# Patient Record
Sex: FEMALE | Race: WHITE | NOT HISPANIC OR LATINO | Employment: OTHER | URBAN - METROPOLITAN AREA
[De-identification: names, ages, dates, MRNs, and addresses within clinical notes are randomized per-mention and may not be internally consistent; named-entity substitution may affect disease eponyms.]

---

## 2017-01-19 ENCOUNTER — OFFICE VISIT - HEALTHEAST (OUTPATIENT)
Dept: INTERNAL MEDICINE | Facility: CLINIC | Age: 82
End: 2017-01-19

## 2017-01-19 ENCOUNTER — RECORDS - HEALTHEAST (OUTPATIENT)
Dept: GENERAL RADIOLOGY | Facility: CLINIC | Age: 82
End: 2017-01-19

## 2017-01-19 DIAGNOSIS — R07.9 CHEST PAIN: ICD-10-CM

## 2017-01-19 DIAGNOSIS — I10 HTN (HYPERTENSION): ICD-10-CM

## 2017-01-19 DIAGNOSIS — H92.03 EAR PAIN, BILATERAL: ICD-10-CM

## 2017-01-19 DIAGNOSIS — L21.9 SEBORRHEIC DERMATITIS: ICD-10-CM

## 2017-01-19 DIAGNOSIS — F41.9 ANXIETY: ICD-10-CM

## 2017-01-19 DIAGNOSIS — M54.9 DORSALGIA, UNSPECIFIED: ICD-10-CM

## 2017-01-19 DIAGNOSIS — M54.9 BACK PAIN: ICD-10-CM

## 2017-01-19 ASSESSMENT — MIFFLIN-ST. JEOR: SCORE: 911.64

## 2017-01-20 ENCOUNTER — COMMUNICATION - HEALTHEAST (OUTPATIENT)
Dept: INTERNAL MEDICINE | Facility: CLINIC | Age: 82
End: 2017-01-20

## 2017-02-16 ENCOUNTER — OFFICE VISIT - HEALTHEAST (OUTPATIENT)
Dept: INTERNAL MEDICINE | Facility: CLINIC | Age: 82
End: 2017-02-16

## 2017-02-16 DIAGNOSIS — F41.9 ANXIETY: ICD-10-CM

## 2017-02-16 DIAGNOSIS — L30.9 DERMATITIS: ICD-10-CM

## 2017-02-16 DIAGNOSIS — E11.9 DIABETES (H): ICD-10-CM

## 2017-02-16 DIAGNOSIS — I10 HTN (HYPERTENSION): ICD-10-CM

## 2017-02-16 DIAGNOSIS — D05.12 DUCTAL CARCINOMA IN SITU (DCIS) OF LEFT BREAST: ICD-10-CM

## 2017-02-16 DIAGNOSIS — L21.9 SEBORRHEIC DERMATITIS: ICD-10-CM

## 2017-02-16 LAB — HBA1C MFR BLD: 6.8 % (ref 3.5–6)

## 2017-02-16 ASSESSMENT — MIFFLIN-ST. JEOR: SCORE: 893.78

## 2017-02-18 ENCOUNTER — COMMUNICATION - HEALTHEAST (OUTPATIENT)
Dept: INTERNAL MEDICINE | Facility: CLINIC | Age: 82
End: 2017-02-18

## 2017-02-18 DIAGNOSIS — K21.9 GERD (GASTROESOPHAGEAL REFLUX DISEASE): ICD-10-CM

## 2017-02-20 ENCOUNTER — COMMUNICATION - HEALTHEAST (OUTPATIENT)
Dept: INTERNAL MEDICINE | Facility: CLINIC | Age: 82
End: 2017-02-20

## 2017-03-08 ENCOUNTER — COMMUNICATION - HEALTHEAST (OUTPATIENT)
Dept: INTERNAL MEDICINE | Facility: CLINIC | Age: 82
End: 2017-03-08

## 2017-03-08 DIAGNOSIS — J45.909 ASTHMA: ICD-10-CM

## 2017-03-08 DIAGNOSIS — I50.9 CHF (CONGESTIVE HEART FAILURE) (H): ICD-10-CM

## 2017-03-11 ENCOUNTER — COMMUNICATION - HEALTHEAST (OUTPATIENT)
Dept: INTERNAL MEDICINE | Facility: CLINIC | Age: 82
End: 2017-03-11

## 2017-03-11 DIAGNOSIS — I10 HTN (HYPERTENSION): ICD-10-CM

## 2017-03-13 ENCOUNTER — COMMUNICATION - HEALTHEAST (OUTPATIENT)
Dept: TELEHEALTH | Facility: CLINIC | Age: 82
End: 2017-03-13

## 2017-03-13 ENCOUNTER — OFFICE VISIT - HEALTHEAST (OUTPATIENT)
Dept: INTERNAL MEDICINE | Facility: CLINIC | Age: 82
End: 2017-03-13

## 2017-03-13 DIAGNOSIS — F41.9 ANXIETY: ICD-10-CM

## 2017-03-13 DIAGNOSIS — H61.20 IMPACTED EAR WAX: ICD-10-CM

## 2017-03-13 DIAGNOSIS — R21 RASH: ICD-10-CM

## 2017-03-13 DIAGNOSIS — I10 HTN (HYPERTENSION): ICD-10-CM

## 2017-03-13 DIAGNOSIS — E11.9 DIABETES TYPE 2, CONTROLLED (H): ICD-10-CM

## 2017-03-22 ENCOUNTER — COMMUNICATION - HEALTHEAST (OUTPATIENT)
Dept: INTERNAL MEDICINE | Facility: CLINIC | Age: 82
End: 2017-03-22

## 2017-03-22 DIAGNOSIS — R52 PAIN: ICD-10-CM

## 2017-03-23 ENCOUNTER — RECORDS - HEALTHEAST (OUTPATIENT)
Dept: ADMINISTRATIVE | Facility: OTHER | Age: 82
End: 2017-03-23

## 2017-03-23 ENCOUNTER — OFFICE VISIT - HEALTHEAST (OUTPATIENT)
Dept: INTERNAL MEDICINE | Facility: CLINIC | Age: 82
End: 2017-03-23

## 2017-03-23 DIAGNOSIS — E11.9 TYPE 2 DIABETES MELLITUS WITHOUT COMPLICATION (H): ICD-10-CM

## 2017-03-23 DIAGNOSIS — F32.89 DEPRESSIVE DISORDER, NOT ELSEWHERE CLASSIFIED: ICD-10-CM

## 2017-03-23 DIAGNOSIS — M54.9 BACK PAIN: ICD-10-CM

## 2017-03-23 DIAGNOSIS — I10 ESSENTIAL HYPERTENSION: ICD-10-CM

## 2017-03-23 ASSESSMENT — MIFFLIN-ST. JEOR: SCORE: 891.37

## 2017-04-12 ENCOUNTER — COMMUNICATION - HEALTHEAST (OUTPATIENT)
Dept: INTERNAL MEDICINE | Facility: CLINIC | Age: 82
End: 2017-04-12

## 2017-04-12 DIAGNOSIS — E78.1 HYPERTRIGLYCERIDEMIA: ICD-10-CM

## 2017-04-12 DIAGNOSIS — L30.9 DERMATITIS: ICD-10-CM

## 2017-04-12 DIAGNOSIS — R21 RASH: ICD-10-CM

## 2017-04-12 DIAGNOSIS — I10 HTN (HYPERTENSION): ICD-10-CM

## 2017-04-12 DIAGNOSIS — E11.9 TYPE 2 DIABETES MELLITUS WITHOUT COMPLICATION (H): ICD-10-CM

## 2017-04-12 DIAGNOSIS — I50.9 CHF (CONGESTIVE HEART FAILURE) (H): ICD-10-CM

## 2017-04-12 DIAGNOSIS — F41.9 ANXIETY: ICD-10-CM

## 2017-04-12 DIAGNOSIS — L21.9 SEBORRHEIC DERMATITIS: ICD-10-CM

## 2017-04-12 DIAGNOSIS — F32.A DEPRESSION: ICD-10-CM

## 2017-04-12 DIAGNOSIS — R60.9 EDEMA: ICD-10-CM

## 2017-04-12 DIAGNOSIS — E78.5 HYPERLIPIDEMIA: ICD-10-CM

## 2017-04-12 DIAGNOSIS — M81.0 OSTEOPOROSIS: ICD-10-CM

## 2017-04-12 DIAGNOSIS — M54.9 BACK PAIN: ICD-10-CM

## 2017-04-12 DIAGNOSIS — J45.909 ASTHMA: ICD-10-CM

## 2017-05-01 ENCOUNTER — COMMUNICATION - HEALTHEAST (OUTPATIENT)
Dept: INTERNAL MEDICINE | Facility: CLINIC | Age: 82
End: 2017-05-01

## 2017-05-01 DIAGNOSIS — F41.9 ANXIETY: ICD-10-CM

## 2017-05-03 ENCOUNTER — COMMUNICATION - HEALTHEAST (OUTPATIENT)
Dept: INTERNAL MEDICINE | Facility: CLINIC | Age: 82
End: 2017-05-03

## 2017-05-17 ENCOUNTER — COMMUNICATION - HEALTHEAST (OUTPATIENT)
Dept: INTERNAL MEDICINE | Facility: CLINIC | Age: 82
End: 2017-05-17

## 2017-05-30 ENCOUNTER — COMMUNICATION - HEALTHEAST (OUTPATIENT)
Dept: INTERNAL MEDICINE | Facility: CLINIC | Age: 82
End: 2017-05-30

## 2017-05-31 ENCOUNTER — COMMUNICATION - HEALTHEAST (OUTPATIENT)
Dept: SCHEDULING | Facility: CLINIC | Age: 82
End: 2017-05-31

## 2018-05-29 ENCOUNTER — COMMUNICATION - HEALTHEAST (OUTPATIENT)
Dept: NURSING | Facility: CLINIC | Age: 83
End: 2018-05-29

## 2018-05-29 ENCOUNTER — COMMUNICATION - HEALTHEAST (OUTPATIENT)
Dept: INTERNAL MEDICINE | Facility: CLINIC | Age: 83
End: 2018-05-29

## 2018-05-29 DIAGNOSIS — I10 HTN (HYPERTENSION): ICD-10-CM

## 2018-05-29 DIAGNOSIS — J45.909 ASTHMA: ICD-10-CM

## 2018-05-29 DIAGNOSIS — F32.A DEPRESSION: ICD-10-CM

## 2018-06-29 ENCOUNTER — COMMUNICATION - HEALTHEAST (OUTPATIENT)
Dept: INTERNAL MEDICINE | Facility: CLINIC | Age: 83
End: 2018-06-29

## 2018-06-29 DIAGNOSIS — I10 HTN (HYPERTENSION): ICD-10-CM

## 2018-12-13 ENCOUNTER — COMMUNICATION - HEALTHEAST (OUTPATIENT)
Dept: INTERNAL MEDICINE | Facility: CLINIC | Age: 83
End: 2018-12-13

## 2018-12-13 DIAGNOSIS — R21 RASH: ICD-10-CM

## 2019-04-24 ENCOUNTER — COMMUNICATION - HEALTHEAST (OUTPATIENT)
Dept: SCHEDULING | Facility: CLINIC | Age: 84
End: 2019-04-24

## 2019-05-29 ENCOUNTER — COMMUNICATION - HEALTHEAST (OUTPATIENT)
Dept: INTERNAL MEDICINE | Facility: CLINIC | Age: 84
End: 2019-05-29

## 2019-05-29 DIAGNOSIS — I10 HTN (HYPERTENSION): ICD-10-CM

## 2021-05-26 ENCOUNTER — RECORDS - HEALTHEAST (OUTPATIENT)
Dept: ADMINISTRATIVE | Facility: CLINIC | Age: 86
End: 2021-05-26

## 2021-05-27 ENCOUNTER — RECORDS - HEALTHEAST (OUTPATIENT)
Dept: ADMINISTRATIVE | Facility: CLINIC | Age: 86
End: 2021-05-27

## 2021-05-28 ENCOUNTER — RECORDS - HEALTHEAST (OUTPATIENT)
Dept: ADMINISTRATIVE | Facility: CLINIC | Age: 86
End: 2021-05-28

## 2021-05-28 NOTE — TELEPHONE ENCOUNTER
Pt would like to know when she was started on the lexapro.    Information given    Sharon Madison, RN  [facility] Medication Refill and Triage Nurse  4/24/2019  10:01 AM      Reason for Disposition    Caller has medication question only, adult not sick, and triager answers question    Protocols used: MEDICATION QUESTION CALL-A-

## 2021-05-29 ENCOUNTER — RECORDS - HEALTHEAST (OUTPATIENT)
Dept: ADMINISTRATIVE | Facility: CLINIC | Age: 86
End: 2021-05-29

## 2021-05-29 NOTE — TELEPHONE ENCOUNTER
RN cannot approve Refill Request    RN can NOT refill this medication overdue for office visits and/or labs.    Tyler Miranda, [facility] Triage/Med Refill 5/30/2019    Requested Prescriptions   Pending Prescriptions Disp Refills     losartan (COZAAR) 50 MG tablet [Pharmacy Med Name: LOSARTAN POTASSIUM 50MG TABS] 180 tablet 3     Sig: TAKE ONE TABLET BY MOUTH TWICE A DAY       Angiotensin Receptor Blocker Protocol Failed - 5/29/2019  4:46 PM        Failed - PCP or prescribing provider visit in past 12 months       Last office visit with prescriber/PCP: 3/23/2017 Indiana Hale MD OR same dept: Visit date not found OR same specialty: 3/23/2017 Indiana Hale MD  Last physical: Visit date not found Last MTM visit: Visit date not found   Next visit within 3 mo: Visit date not found  Next physical within 3 mo: Visit date not found  Prescriber OR PCP: Indiana Hale MD  Last diagnosis associated with med order: 1. HTN (hypertension)  - losartan (COZAAR) 50 MG tablet [Pharmacy Med Name: LOSARTAN POTASSIUM 50MG TABS]; TAKE ONE TABLET BY MOUTH TWICE A DAY  Dispense: 180 tablet; Refill: 3    If protocol passes may refill for 12 months if within 3 months of last provider visit (or a total of 15 months).             Failed - Serum potassium within the past 12 months     No results found for: LN-POTASSIUM          Failed - Blood pressure filed in past 12 months     BP Readings from Last 1 Encounters:   03/23/17 148/60             Failed - Serum creatinine within the past 12 months     Creatinine   Date Value Ref Range Status   12/13/2016 1.20 (H) 0.60 - 1.10 mg/dL Final

## 2021-05-29 NOTE — TELEPHONE ENCOUNTER
I have not seen pt since 2017, no labs since 2016. From chart review it looks like [facility]    Please ask pharmacy to request refills from new PCP:  Renetta Wynn

## 2021-05-30 ENCOUNTER — RECORDS - HEALTHEAST (OUTPATIENT)
Dept: ADMINISTRATIVE | Facility: CLINIC | Age: 86
End: 2021-05-30

## 2021-05-30 VITALS — WEIGHT: 118.5 LBS | BODY MASS INDEX: 21 KG/M2 | HEIGHT: 63 IN

## 2021-05-30 VITALS — BODY MASS INDEX: 20.3 KG/M2 | HEIGHT: 63 IN | WEIGHT: 114.56 LBS

## 2021-05-30 VITALS — BODY MASS INDEX: 20.2 KG/M2 | HEIGHT: 63 IN | WEIGHT: 114.03 LBS

## 2021-05-30 VITALS — BODY MASS INDEX: 20.35 KG/M2 | WEIGHT: 114.9 LBS

## 2021-05-31 ENCOUNTER — RECORDS - HEALTHEAST (OUTPATIENT)
Dept: ADMINISTRATIVE | Facility: CLINIC | Age: 86
End: 2021-05-31

## 2021-06-01 ENCOUNTER — RECORDS - HEALTHEAST (OUTPATIENT)
Dept: ADMINISTRATIVE | Facility: CLINIC | Age: 86
End: 2021-06-01

## 2021-06-02 ENCOUNTER — RECORDS - HEALTHEAST (OUTPATIENT)
Dept: ADMINISTRATIVE | Facility: CLINIC | Age: 86
End: 2021-06-02

## 2021-06-08 NOTE — PROGRESS NOTES
[facility] Follow Up Note    Assessment/Plan:    1. Chest pain  Patient is a poor historian.  Her  does endorses noticing that she is having some chest discomfort with exertion.  It's very short-lived.  EKG on last visit showed left bundle branch block.  Patient does have a distant history of coronary artery disease.  Discussed that she will need stress testing.  She does have a cardiologist that she follows with an I recommended that she sees him in the office on.    2. HTN (hypertension)  Still above goal.  Given the fact that she might have a urinary artery disease and would like it to be less than 140.  Heart rate is currently in the 60s so I am not going to increase her metoprolol and she will continue on 50 mg a day she is already on losartan maximum dose at 50 mg twice a day, I did not decide to give her hydrochlorothiazide because she has dry mouth and I do not want exacerbate that.  For now we'll increase her Norvasc from 2.5-5 mg a day and she will follow-up with me in a month.  She will continue monitoring blood pressure at home.  I think anxiety contributes to her high blood pressure.  - amLODIPine (NORVASC) 5 MG tablet; Take 1 tablet (5 mg total) by mouth daily.  Dispense: 30 tablet; Refill: 11    3. Anxiety  She skeletally Lexapro 20 mg a day.  She takes Klonopin at night for sleep which helps a lot.  During the day she still has a lot of anxiety and will try BuSpar twice a day of his breakfast and lunch.  She declined psychological evaluation.  - busPIRone (BUSPAR) 5 MG tablet; Take twice a day with breakfast and lunch  Dispense: 60 tablet; Refill: 3    4. Seborrheic dermatitis  She can use to him Synalar cream on her ears but I often not to use it on her face and she'll tried Nasarel cream on ears and face.  - ketoconazole (NIZORAL) 2 % cream; Apply twice a day to face and ears  Dispense: 60 g; Refill: 1    5. Back pain  Currently has resolved but due to her history of breast  cancer did recommend we do a thoracic x-ray.  - XR Thoracic Spine 3 VWS; Future    6.  History of breast cancer, status post right mastectomy and left lumpectomy.  In the future she will need to see oncologist to review her case to determine if she needs to be on chemotherapy prophylactic medication.  Currently she is too overwhelmed and will deferred for later.      Indiana Hale MD    Chief Complaint:  Chief Complaint   Patient presents with     Follow-up     2 mnths       History of Present Illness:  Selam is a 89 y.o. female with history of breast cancer (status post right mastectomy and left lumpectomy), high blood pressure, question process, recurrent falls, mild renal insufficiency, mild asthma, vitamin D deficiency, anxiety and mild diabetes was currently here for follow-up of her blood pressure.    Her last visit here didn't add amlodipine 2.5 mg to her regimen.  She takes metoprolol XL 50 mg a day and losartan 50 mg twice a day.  Patient does not feel that amlodipine has helped.  She has been checking her blood pressures at home and it ranges between 150-170.  Heart rate usually is in the 60s.  Patient does have problems with dry mouth so diuretic would not be a good choice for her.  I think anxiety partly contributes to her high blood pressure as well and we'll address it to.  For now we discussed increasing amlodipine to 5 mg.    Patient also has a distant history of off hard attack and heart disease.  She tells me that she sees Dr. Terrazas [facility] once a year.  Currently she does have mild exertional shortness of breath and palpitations and chest pain.  Patient is not a very good historian but her  did confirm that she does have exertional chest pains which is shortly.  Patient currently is on aspirin.  She had EKG done on her last visit which showed left bundle branch block but no other changes.  We discussed that patient needs to have a stress test done.  I recommended that she  "follows up with her cardiologist.    Patient is a very anxious.  Overall plan is to move to assisted living in the spring but currently she and her  are trying to clean up house and get it radiated force saddling.   does confirm that patient is \"sweating the small stuff.  She also has been doing more around the house I'm not sure if it's secondary to patient doing less ADL due to her anxiety.  She is currently on Lexapro 20 mg, she does take Klonopin 0.5 mg at bedtime and sleeps well.  She refuses seeing a psychologist.  We discussed adding BuSpar twice a day to her regimen.  I think once she moves to assisted living, anxiety will be greatly improved as well.    Patient also had transient upper thoracic back pain which currently has resolved.  She fell a month ago which was a mechanical fall and has not had any recent falls.  She does have history of breast cancer.  We will do an x-ray of her back.    She also has mild type 2 diabetes.  She does check her sugars and in the mornings are usually 120s to 130s and at bedtime 170s on average.  We'll check her A1c on follow-up.  I did recommend that she sees an ophthalmologist.    She is also complaining about itching around her eyebrows and sure years.  In the past as a dermatologist we gave her triamcinolone cream which she uses more than twice a day.  Skin flakiness also happens often.  Discussed that she has seborrheic dermatitis.  As her not to use to him Synalar on her face but on her ears and we will add Nasarel cream to her regimen as well.    Review of Systems:  A comprehensive review of systems was performed and was otherwise negative.  Patient constipation is controlled on stool softeners.  She denies any abdominal pain.  No recent falls.    PFSH:  Social History: Reviewed  History   Smoking Status     Never Smoker   Smokeless Tobacco     Never Used     Social History     Social History Narrative    lives with  who is on HD and requires a " special diet. Kids are looking for an assisted living facility for them.       Past History: Reviewed  Current Outpatient Prescriptions   Medication Sig Dispense Refill     albuterol (PROVENTIL HFA;VENTOLIN HFA) 90 mcg/actuation inhaler Inhale 2 puffs every 6 (six) hours as needed for wheezing.       alendronate (FOSAMAX) 70 MG tablet TAKE 1 TABLET WEEKLY IN THE MORNING WITH 8 OZ OF WATER- NO FOOD, FLUIDS OR MEDS FOR 1/2 HOUR. 12 tablet 3     aspirin 81 mg chewable tablet Chew 81 mg daily.       blood glucose test strips Use 1 each As Directed daily. 100 each 11     calcium carbonate-vitamin D3 (CALCIUM 600 + D,3,) 600 mg(1,500mg) -200 unit per tablet Take 1 tablet by mouth 2 (two) times a day.       calcium polycarbophil (FIBERCON) 625 mg tablet Take 1,250 mg by mouth daily.       cholecalciferol, vitamin D3, 400 unit Tab Take 2 tablets (800 Units total) by mouth bedtime. 180 each 3     clonazePAM (KLONOPIN) 0.5 MG tablet Take 1 tablet (0.5 mg total) by mouth bedtime. 90 tablet 0     digoxin (LANOXIN) 125 mcg tablet TAKE 1 TABLET (125 MCG TOTAL) BY MOUTH DAILY. 90 tablet 0     DOCOSAHEXANOIC ACID/EPA (FISH OIL ORAL) Take 1 capsule by mouth daily.       docusate sodium (COLACE) 100 MG capsule Take 3 capsules (300 mg total) by mouth daily. 90 capsule 3     escitalopram oxalate (LEXAPRO) 20 MG tablet 1 tabs daily 90 tablet 3     famotidine (PEPCID) 40 MG tablet Take 40 mg by mouth daily with supper.        fenofibrate (TRICOR) 145 MG tablet TAKE 1 TABLET DAILY 90 tablet 3     fexofenadine (ALLEGRA) 180 MG tablet Take 180 mg by mouth daily.       furosemide (LASIX) 40 MG tablet TAKE 1 TABLET BY MOUTH DAILY 90 tablet 3     lancets (ONETOUCH DELICA LANCETS) 30 gauge Misc Use one device daily as directed 100 each 1     lidocaine (LIDODERM) 5 % Place 1 patch on the skin daily as needed. Remove & Discard patch within 12 hours or as directed by MD       losartan (COZAAR) 50 MG tablet Take 1 tablet (50 mg total) by mouth 2  "(two) times a day. 180 tablet 3     magnesium oxide (MAG-OX) 400 mg tablet Take 400 mg by mouth daily.       metoprolol succinate (TOPROL-XL) 50 MG 24 hr tablet Take 50 mg by mouth daily.       multivitamin therapeutic (THERAGRAN) tablet Take 1 tablet by mouth daily.       polyethylene glycol (MIRALAX) 17 gram packet One packet daily as needed 30 each 6     simvastatin (ZOCOR) 20 MG tablet TAKE 1 TABLET DAILY WITH EVENING MEAL. 90 tablet 3     amLODIPine (NORVASC) 5 MG tablet Take 1 tablet (5 mg total) by mouth daily. 30 tablet 11     busPIRone (BUSPAR) 5 MG tablet Take twice a day with breakfast and lunch 60 tablet 3     ketoconazole (NIZORAL) 2 % cream Apply twice a day to face and ears 60 g 1     No current facility-administered medications for this visit.        Physical Exam:    Vitals:    01/19/17 1108   BP: 150/72   Patient Site: Left Arm   Patient Position: Sitting   Cuff Size: Adult Regular   Pulse: 64   Resp: 16   Temp: 97.4  F (36.3  C)   TempSrc: Oral   SpO2: 93%   Weight: 118 lb 8 oz (53.8 kg)   Height: 5' 3\" (1.6 m)     Wt Readings from Last 3 Encounters:   01/19/17 118 lb 8 oz (53.8 kg)   12/15/16 116 lb (52.6 kg)   12/13/16 115 lb (52.2 kg)     Body mass index is 20.99 kg/(m^2).    Constitutional:  Reveals a pleasant anxious female.  Vitals:  Per nursing notes.  HEENT:No cervical LAD, no thyromegaly,  conjunctiva is pink, no scleral icterus, TMs are visualized and normal bl, oropharynx is clear, no exudates, she wears hearing aids bilaterally, she does have mild dermatitis of the year and in the eyebrow region.  Cardiac:  Regular rate and rhythm,no murmurs, rubs, or gallops. Legs without edema. Palpation of the radial pulse regular.  Lungs: Clear to auscultation bl.  Respiratory effort normal.  Mild kyphosis is noted.  Abdomen:positive BS, soft, nontender, nondistended.  No hepato-splenomagaly  Skin:  Seborrheic dermatitis of her face and ears as above  Rheumatologic: Hand or wrist arthritis noted.   "   Psychiatric: affect appropriate, memory intact.  Patient is moderately anxious, has thought processes linear, no flight of ideas or pressured speech.    Data Review:    Analysis and Summary of Old Records (2): Yes     Records Requested (1): No      Other History Summarized (from other people in the room) (2): Yes     Radiology Tests Summarized (XRAY/CT/MRI/DXA) (1): No    Labs Reviewed (1): Yes    Medicine Tests Reviewed (EKG/ECHO/COLONOSCOPY/EGD) (1): Yes, recent EKG    Independent Review of EKG or X-RAY (2): No

## 2021-06-08 NOTE — PROGRESS NOTES
[facility] Follow Up Note    Assessment/Plan:    1. Diabetes  Diet controlled.  Patient had questions about her diet again and use of glucometer.  We'll repeat her A1c today and order another session of his diabetic educator.  - Glycosylated Hemoglobin A1c  - Ambulatory referral to Diabetic Education    2. Seborrheic dermatitis  She will continue using Nasarel cream.  Discussed that she can continue using it on ongoing basis    3. Anxiety  Continue Lexapro and Klonopin at night for sleep.  She is also done very well with addition of postpartum this regimen and refill was provided.  - busPIRone (BUSPAR) 5 MG tablet; Take twice a day with breakfast and lunch  Dispense: 180 tablet; Refill: 3    4. HTN (hypertension)  Much improved his addition of Norvasc 5 mg a day.  She'll continue Toprol and losartan.  Because she has intermittent mild chest pains that he recommend that she follows up with cardiologist in the near future.    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  Breast exam today was normal.  She had recent lumpectomy on the left side in August 2016.  Because she does have intermittent pain in that area recommended that she sees her breast surgeon for follow-up.    6.  Suprapubic Dermatitis  I'm not sure what cream she has been using in the area.  Currently there is no intertrigo.  I recommended that she uses clotrimazole and if it does not improve it the consent her to dermatologist.  - clotrimazole (ITCH RELIEF, CLOTRIMAZOLE,) 1 % cream; Apply twice a day to genital area for 3-4 weeks  Dispense: 60 g; Refill: 0    Indiana Hale MD    Chief Complaint:  Chief Complaint   Patient presents with     Medication Management     Rash     around eyes      Grief/loss      passed 2/15       History of Present Illness:  Selam is a 89 y.o. female this history of breast cancer (status post right mastectomy and left lumpectomy), high blood  pressure, recurrent falls, renal insufficiency, mild persistent asthma, vitamin D deficiency, anxiety, diabetes and coronary artery disease is currently here for follow-up.    Patient's  passed away yesterday.  He did have multiple comorbidities and was on hemodialysis so she is death was not unforeseen.  Patient reports that in a certain sense she is relieved that she is not suffering any longer.  She has a very good support system from her children and grandchildren.  She is accompanied by 2 granddaughters today.  Patient seems to be at [place].  She does suffer from anxiety and depression.  She is on Lexapro 20 mg a day and takes Klonopin at night for sleep.  On her last visit also started her on buspirone 5 mg twice a day and she tells me that it helped her quite a beat.  I did give her a refill today on that.    Patient also has history of high blood pressure and coronary artery disease.  He was several months her blood pressure was elevated and we added Norvasc to her Toprol and losartan.  She is currently on 5 mg of Norvasc a day.  Blood pressure in office today was perfect.  She denies any dizziness lightheadedness.  She does get intermittent chest pains on the left which are not long lasting.  She is on aspirin.  Previously I recommend that she follows up with her cardiologist and I again discussed that with her and her granddaughters.  She will see her cardiologist in the near future.    Patient also had bilateral breast cancer and recent lumpectomy in her left breast in August 2016.  Unclear whether discomfort on the left side is from her card from her breasts.  Breast exam today was normal.  I did recommend that she follows up with her breast surgeon.    Patient also has diabetes.  Recent A1c was 7.5.  She has been controlling her diet and sugars at home has been in 1:30 range.  We'll repeat A1c again today.  She did have questions about diet.  I recommended that she sees diabetic educator again.   It appears that her  was diabetic and was helping her with glucometer use.  Patient is a little unsure on glucometer use herself and I think will benefit from seeing diabetic educator again.    Review of Systems:  A comprehensive review of systems was performed and was otherwise negative.  No recent falls.  She sleeps well.  Depression and anxiety is well controlled.  No abdominal pain.  No persistent chest pain shortness of breath or dizziness.  She is also complaining off ongoing rash on her face and also suprapubic area.    PFSH:  Social History: Reviewed  History   Smoking Status     Never Smoker   Smokeless Tobacco     Never Used     Social History     Social History Narrative    Patient became a  in February 2017.   had multiple medical issues.. Kids are looking for an assisted living facility for her.       Past History: Reviewed  Current Outpatient Prescriptions   Medication Sig Dispense Refill     albuterol (PROVENTIL HFA;VENTOLIN HFA) 90 mcg/actuation inhaler Inhale 2 puffs every 6 (six) hours as needed for wheezing.       alendronate (FOSAMAX) 70 MG tablet TAKE 1 TABLET WEEKLY IN THE MORNING WITH 8 OZ OF WATER- NO FOOD, FLUIDS OR MEDS FOR 1/2 HOUR. 12 tablet 3     amLODIPine (NORVASC) 5 MG tablet Take 1 tablet (5 mg total) by mouth daily. 30 tablet 11     aspirin 81 mg chewable tablet Chew 81 mg daily.       blood glucose test strips Use 1 each As Directed daily. 100 each 11     busPIRone (BUSPAR) 5 MG tablet Take twice a day with breakfast and lunch 180 tablet 3     calcium carbonate-vitamin D3 (CALCIUM 600 + D,3,) 600 mg(1,500mg) -200 unit per tablet Take 1 tablet by mouth 2 (two) times a day.       calcium polycarbophil (FIBERCON) 625 mg tablet Take 1,250 mg by mouth daily.       cholecalciferol, vitamin D3, 400 unit Tab Take 2 tablets (800 Units total) by mouth bedtime. 180 each 3     clotrimazole (ITCH RELIEF, CLOTRIMAZOLE,) 1 % cream Apply twice a day to genital area for 3-4 weeks  "60 g 0     digoxin (LANOXIN) 125 mcg tablet TAKE 1 TABLET (125 MCG TOTAL) BY MOUTH DAILY. 90 tablet 0     DOCOSAHEXANOIC ACID/EPA (FISH OIL ORAL) Take 1 capsule by mouth daily.       docusate sodium (COLACE) 100 MG capsule Take 3 capsules (300 mg total) by mouth daily. 90 capsule 3     escitalopram oxalate (LEXAPRO) 20 MG tablet 1 tabs daily 90 tablet 3     famotidine (PEPCID) 40 MG tablet Take 40 mg by mouth daily with supper.        fenofibrate (TRICOR) 145 MG tablet TAKE 1 TABLET DAILY 90 tablet 3     fexofenadine (ALLEGRA) 180 MG tablet Take 180 mg by mouth daily.       furosemide (LASIX) 40 MG tablet TAKE 1 TABLET BY MOUTH DAILY 90 tablet 3     ketoconazole (NIZORAL) 2 % cream Apply twice a day to face and ears 60 g 1     lancets (ONETOUCH DELICA LANCETS) 30 gauge Misc Use one device daily as directed 100 each 1     lidocaine (LIDODERM) 5 % Place 1 patch on the skin daily as needed. Remove & Discard patch within 12 hours or as directed by MD       losartan (COZAAR) 50 MG tablet Take 1 tablet (50 mg total) by mouth 2 (two) times a day. 180 tablet 3     magnesium oxide (MAG-OX) 400 mg tablet Take 400 mg by mouth daily.       metoprolol succinate (TOPROL-XL) 50 MG 24 hr tablet Take 50 mg by mouth daily.       multivitamin therapeutic (THERAGRAN) tablet Take 1 tablet by mouth daily.       polyethylene glycol (MIRALAX) 17 gram packet One packet daily as needed 30 each 6     simvastatin (ZOCOR) 20 MG tablet TAKE 1 TABLET DAILY WITH EVENING MEAL. 90 tablet 3     No current facility-administered medications for this visit.        Physical Exam:    Vitals:    02/16/17 1634   BP: 128/60   Patient Site: Left Arm   Patient Position: Sitting   Cuff Size: Adult Regular   Pulse: 64   Resp: 18   SpO2: 96%   Weight: 114 lb 9 oz (52 kg)   Height: 5' 3\" (1.6 m)     Wt Readings from Last 3 Encounters:   02/16/17 114 lb 9 oz (52 kg)   01/19/17 118 lb 8 oz (53.8 kg)   12/15/16 116 lb (52.6 kg)     Body mass index is 20.29 " kg/(m^2).    Constitutional:  Reveals a pleasant  female.  Vitals:  Per nursing notes.  HEENT:No cervical LAD, no thyromegaly,  conjunctiva is pink, no scleral icterus, TMs are visualized and normal bl, oropharynx is clear, no exudates, she wears hearing aids bilaterally   Cardiac:  Regular rate and rhythm,no murmurs, rubs, or gallops.Legs without edema. Palpation of the radial pulse regular.  Lungs: Clear to auscultation bl.  Respiratory effort normal.  Abdomen:positive BS, soft, nontender, nondistended.  No hepato-splenomagaly  Skin: Slight scaly rash on her for head, mild dermatitis in the suprapubic area, no rash in the groin.     Psychiatric: affect appropriate, memory intact.  She is mildly anxious but less than before.      Data Review:    Analysis and Summary of Old Records (2): Yes     Records Requested (1): No      Other History Summarized (from other people in the room) (2): No    Radiology Tests Summarized (XRAY/CT/MRI/DXA) (1): No    Labs Reviewed (1): Yes    Medicine Tests Reviewed (EKG/ECHO/COLONOSCOPY/EGD) (1): No    Independent Review of EKG or X-RAY (2): No

## 2021-06-09 NOTE — PROGRESS NOTES
[facility] Follow Up Note    Assessment/Plan:    1. HTN (hypertension)  Controlled, continue norvasc, toprol,losartan  - amLODIPine (NORVASC) 5 MG tablet; Take 1 tablet (5 mg total) by mouth daily.  Dispense: 90 tablet; Refill: 3    2. Anxiety  Continue Lexapro and Buspar. Pt recently became a , mood is stable. Will be moving to senior living housing in April.  - busPIRone (BUSPAR) 5 MG tablet; Take twice a day with breakfast and lunch  Dispense: 180 tablet; Refill: 3    3. Diabetes type 2, controlled  A1c is good.  used to do lancets for her, she is slightly unsure/anxious how to do it herself. Did demonstrate how to use lancet during visit. Should f/u with diabetic educator for further reinforcement of glucometer operation and diet.  - Ambulatory referral to Diabetic Education    4. Rash  Most likely seborrheic dermatitis and tinea. She is unable to tolerate Clotrimazole. Will continue Ketoconazole, add triamcinolone. If not better, will see derm.  - Ambulatory referral to Dermatology  - triamcinolone (KENALOG) 0.1 % cream; Apply daily as needed  Dispense: 80 g; Refill: 1    5. Impacted ear wax  Pt had vertigo in the past when had her ear flushed out. Will refer to ENT for ear was removal.  - Ambulatory referral to ENT    Indiana Hale MD    Chief Complaint:  Chief Complaint   Patient presents with     Follow-up     Diabetes       History of Present Illness:  Selam is a 89 y.o. female Ms. history of previous breast cancer (status post right mastectomy and left lumpectomy), high blood pressure, falls, renal insufficiency, asthma, anxiety, type 2 diabetes and heart disease was currently here for follow-up with one of her daughters.    Patient's  passed away several weeks ago.  Currently she is handling it well.  Daughter informs me that patient will be moving to senior living housing in April.  She does have anxiety and depression.  She is on Lexapro 20 mg a day.  BuSpar as were  working well for her and she takes it twice a day and refill was provided.    She has diet-controlled type 2 diabetes.  Recent A1c was 6.8.  Patient was very excited to hear that sugars have improved.  Her  was the one who would put needle into the lancets and patient does not know how to do it.  We did spend some time going over how to use lancets and that was demonstrated to her.  Also encouraged her to see a diabetic educator to further solidify her glucometer use and diabetic diet.    Patient continues to have various rashes.  She had a rash around her years and on her right forearm.  Clotrimazole was giving her a burning sensation.  She does use ketoconazole in those areas.  She also has triamcinolone cream and we discussed that she can use it together with triamcinolone cream.  She does have dermatitis around her years which are frequently itchy and swollen.  If rash does not improve on ketoconazole and triamcinolone I asked her to see a dermatologist.    Patient is also hard of hearing.  She is planning to get new hearing aids.  She also had back some impaction today.  In the past flushing out for years, significant dizziness and falls.  We discussed seeing an ENT for this.    Review of Systems:  A comprehensive review of systems was performed and was otherwise negative.  Her weight is stable.  No chest pains palpitations.  She walked 2 miles today and feels good.  No shortness of breath.    PFSH:  Social History: Reviewed  History   Smoking Status     Never Smoker   Smokeless Tobacco     Never Used     Social History     Social History Narrative    Patient became a  in February 2017.   had multiple medical issues.. Kids are looking for an assisted living facility for her.       Past History: Reviewed  Current Outpatient Prescriptions   Medication Sig Dispense Refill     alendronate (FOSAMAX) 70 MG tablet TAKE 1 TABLET WEEKLY IN THE MORNING WITH 8 OZ OF WATER- NO FOOD, FLUIDS OR MEDS FOR  1/2 HOUR. 12 tablet 3     aspirin 81 mg chewable tablet Chew 81 mg daily.       blood glucose test strips Use 1 each As Directed daily. 100 each 11     busPIRone (BUSPAR) 5 MG tablet Take twice a day with breakfast and lunch 180 tablet 3     calcium carbonate-vitamin D3 (CALCIUM 600 + D,3,) 600 mg(1,500mg) -200 unit per tablet Take 1 tablet by mouth 2 (two) times a day.       calcium polycarbophil (FIBERCON) 625 mg tablet Take 1,250 mg by mouth daily.       cholecalciferol, vitamin D3, 400 unit Tab Take 2 tablets (800 Units total) by mouth bedtime. 180 each 3     clonazePAM (KLONOPIN) 0.5 MG tablet Take 0.5 mg by mouth bedtime.       clotrimazole (ITCH RELIEF, CLOTRIMAZOLE,) 1 % cream Apply twice a day to genital area for 3-4 weeks 60 g 0     digoxin (LANOXIN) 125 mcg tablet TAKE 1 TABLET (125 MCG TOTAL) BY MOUTH DAILY. 90 tablet 0     DOCOSAHEXANOIC ACID/EPA (FISH OIL ORAL) Take 1 capsule by mouth daily.       docusate sodium (COLACE) 100 MG capsule Take 3 capsules (300 mg total) by mouth daily. 90 capsule 3     escitalopram oxalate (LEXAPRO) 20 MG tablet 1 tabs daily 90 tablet 3     famotidine (PEPCID) 40 MG tablet Take 40 mg by mouth daily with supper.        famotidine (PEPCID) 40 MG tablet TAKE 1 TABLET BY MOUTH ONE TIME DAILY 90 tablet 3     fenofibrate (TRICOR) 145 MG tablet TAKE 1 TABLET DAILY 90 tablet 3     fexofenadine (ALLEGRA) 180 MG tablet Take 180 mg by mouth daily.       furosemide (LASIX) 40 MG tablet TAKE 1 TABLET BY MOUTH DAILY 90 tablet 3     ketoconazole (NIZORAL) 2 % cream Apply twice a day to face and ears 60 g 1     lancets (ONETOUCH DELICA LANCETS) 30 gauge Misc Use one device daily as directed 100 each 1     lidocaine (LIDODERM) 5 % Place 1 patch on the skin daily as needed. Remove & Discard patch within 12 hours or as directed by MD       losartan (COZAAR) 50 MG tablet Take 1 tablet (50 mg total) by mouth 2 (two) times a day. 180 tablet 3     magnesium oxide (MAG-OX) 400 mg tablet Take 400  mg by mouth daily.       metoprolol succinate (TOPROL-XL) 50 MG 24 hr tablet Take 50 mg by mouth daily.       multivitamin therapeutic (THERAGRAN) tablet Take 1 tablet by mouth daily.       polyethylene glycol (MIRALAX) 17 gram packet One packet daily as needed 30 each 6     PROAIR HFA 90 mcg/actuation inhaler INHALE 2 PUFFS BY MOUTH 2-4 TIMES DAILY AS NEEDED 25.5 Inhaler 2     simvastatin (ZOCOR) 20 MG tablet TAKE 1 TABLET DAILY WITH EVENING MEAL. 90 tablet 3     amLODIPine (NORVASC) 5 MG tablet Take 1 tablet (5 mg total) by mouth daily. 90 tablet 3     triamcinolone (KENALOG) 0.1 % cream Apply daily as needed 80 g 1     No current facility-administered medications for this visit.        Physical Exam:    Vitals:    03/13/17 1206   BP: 122/54   Patient Site: Left Arm   Patient Position: Sitting   Cuff Size: Adult Regular   Pulse: 66   Weight: 114 lb 14.4 oz (52.1 kg)     Wt Readings from Last 3 Encounters:   03/13/17 114 lb 14.4 oz (52.1 kg)   02/16/17 114 lb 9 oz (52 kg)   01/19/17 118 lb 8 oz (53.8 kg)     Body mass index is 20.35 kg/(m^2).    Constitutional:  Reveals a pleasant female.  Vitals:  Per nursing notes.  HEENT:No cervical LAD, no thyromegaly,  conjunctiva is pink, no scleral icterus, TMs are visualized and obstructed by wax bl, oropharynx is clear, no exudates, patient is hard of hearing   Cardiac:  Regular rate and rhythm,no murmurs, rubs, or gallops. Legs without edema. Palpation of the radial pulse regular.  Lungs: Clear to auscultation bl.  Respiratory effort normal.  Abdomen:positive BS, soft, nontender, nondistended.  No hepato-splenomagaly  Skin: Mild dermatitis noted around she ears.  On her right forearm there is also round flaky lesion with central clearing.     Psychiatric: affect appropriate, memory intact.  Moderate anxiety noted.      Data Review:    Analysis and Summary of Old Records (2): Yes    Records Requested (1): No      Other History Summarized (from other people in the room)  (2): He has daughter    Radiology Tests Summarized (XRAY/CT/MRI/DXA) (1): No    Labs Reviewed (1): Yes    Medicine Tests Reviewed (EKG/ECHO/COLONOSCOPY/EGD) (1): No    Independent Review of EKG or X-RAY (2): No

## 2021-06-09 NOTE — PROGRESS NOTES
[facility] Follow Up Note    Assessment/Plan:    1. Back pain  Most likely sprain however be discussed if pain does not resolve in the next few weeks she will need imaging given her history of breast cancer.  Patient refused x-ray today.  Lidoderm patch was ordered.  She can also use Tylenol 500 mg 3 times a day as needed.  She will be evaluated by physical therapist at her assisted living.  - lidocaine (LIDODERM) 5 %; Place 1 patch on the skin daily as needed. Remove & Discard patch within 12 hours or as directed by MD  Dispense: 30 patch; Refill: 0    2. Type 2 diabetes mellitus without complication  Diet controlled.  Last A1c was 6.8.    3. Depression and anxiety  Controlled on Lexapro and BuSpar.  Patient uses Klonopin for insomnia.    4. Essential hypertension  Controlled on several blood pressure medications.  Patient denies orthostasis or dizziness.    5.  Mole on her left shoulder.  Appears to be fairly large and it appeared recently.  She will be seeing dermatologist soon.    6.  Advanced directives were discussed today.  Patient wants to be DNR/DNI.  There is no treatable reversible conditions she wants to be uncomfortable care.  She signed POLST today.    Indiana Hale MD    Chief Complaint:  Chief Complaint   Patient presents with     Back Pain     Medication Management     Refill lidocaine patch     Paperwork     forms for [email or web address] apt       History of Present Illness:  Selam is a 89 y.o. female with history of previous breast cancer (status post right mastectomy and left lumpectomy), high blood pressure, falls, renal insufficiency, asthma, anxiety, type 2 diabetes and heart disease was currently here to have forms filled out and get a refill on Lidoderm patch for her back pain.    Patient's  passed away a month ago and she is currently relocating to [email or web address] in [place].  She is excited about it.  We went over POLST form.  Patient wants to be DNR/DNI.  If  there is a reversible condition she wants it to be treated but without need for intubation or chest compressions.    Patient has significant anxiety and history of depression.  She is currently on Lexapro and buspirone.  Patient's  had a lot of medical conditions.  I think since he passed away patient's anxiety is better.  She appears to be in good spirits today.  She takes Klonopin to help her sleep at night.    Patient also complaining about mild low back pain.  She has been packing and moving boxes a lot due to relocation.  She has had similar back pain before and tells me that it's a sprain.  She denies any radiculopathy.  No falls.  In the past she had good results his lidocaine patch and that's what she is requesting.  Discussed use of Tylenol but patient is afraid of accidentally taking too much.  Discussed that she can take 1 tablet up to 3 times a day with no problem.  Also discussed that with her history of breast cancer if pain persists we will need to do imaging.  Patient refused x-ray today.  We will follow-up with her in 6 weeks to see how she is doing and she will have physical therapy evaluation at her new place.    Patient has mild diet controlled diabetes.  Recent A1c was less than 7.  I recommended that she continues checking sugars 3 times a week before breakfast.    History of asthma.  Mild and intermittent.  Patient habitually uses albuterol at bedtime.  She denies any shortness of breath or cough.    Patient on several blood pressure medications.  Blood pressure is appropriate today.  She denies any dizziness lightheadedness or chest pain.    Patient also developed a mole on her left shoulder in the last 3 weeks.  Looks verrucous on exam.  She already has appointment visit dermatologist scheduled.    Review of Systems:  A comprehensive review of systems was performed and was otherwise negative bowels are regular as long as she takes her FiberCon and stool softeners.  She has mild  urinary incontinence is odd burning.  No fevers chills or sweats.    PFSH:  Social History: Reviewed  History   Smoking Status     Never Smoker   Smokeless Tobacco     Never Used     Social History     Social History Narrative    Patient became a  in February 2017.   had multiple medical issues.. Kids are looking for an assisted living facility for her.       Past History: Reviewed  Current Outpatient Prescriptions   Medication Sig Dispense Refill     alendronate (FOSAMAX) 70 MG tablet TAKE 1 TABLET WEEKLY IN THE MORNING WITH 8 OZ OF WATER- NO FOOD, FLUIDS OR MEDS FOR 1/2 HOUR. 12 tablet 3     amLODIPine (NORVASC) 5 MG tablet Take 1 tablet (5 mg total) by mouth daily. 90 tablet 3     aspirin 81 mg chewable tablet Chew 81 mg daily.       blood glucose test strips Use 1 each As Directed daily. 100 each 11     busPIRone (BUSPAR) 5 MG tablet Take twice a day with breakfast and lunch 180 tablet 3     calcium carbonate-vitamin D3 (CALCIUM 600 + D,3,) 600 mg(1,500mg) -200 unit per tablet Take 1 tablet by mouth 2 (two) times a day.       calcium polycarbophil (FIBERCON) 625 mg tablet Take 1,250 mg by mouth daily.       cholecalciferol, vitamin D3, 400 unit Tab Take 2 tablets (800 Units total) by mouth bedtime. 180 each 3     clonazePAM (KLONOPIN) 0.5 MG tablet Take 0.5 mg by mouth bedtime.       clotrimazole (ITCH RELIEF, CLOTRIMAZOLE,) 1 % cream Apply twice a day to genital area for 3-4 weeks 60 g 0     digoxin (LANOXIN) 125 mcg tablet TAKE 1 TABLET (125 MCG TOTAL) BY MOUTH DAILY. 90 tablet 0     DOCOSAHEXANOIC ACID/EPA (FISH OIL ORAL) Take 1 capsule by mouth daily.       docusate sodium (COLACE) 100 MG capsule Take 3 capsules (300 mg total) by mouth daily. 90 capsule 3     escitalopram oxalate (LEXAPRO) 20 MG tablet 1 tabs daily 90 tablet 3     famotidine (PEPCID) 40 MG tablet Take 40 mg by mouth daily with supper.        fenofibrate (TRICOR) 145 MG tablet TAKE 1 TABLET DAILY 90 tablet 3     fexofenadine  "(ALLEGRA) 180 MG tablet Take 180 mg by mouth daily.       furosemide (LASIX) 40 MG tablet TAKE 1 TABLET BY MOUTH DAILY 90 tablet 3     ketoconazole (NIZORAL) 2 % cream Apply twice a day to face and ears 60 g 1     lancets (ONETOUCH DELICA LANCETS) 30 gauge Misc Use one device daily as directed 100 each 1     lidocaine (LIDODERM) 5 % Place 1 patch on the skin daily as needed. Remove & Discard patch within 12 hours or as directed by MD 30 patch 0     losartan (COZAAR) 50 MG tablet Take 1 tablet (50 mg total) by mouth 2 (two) times a day. 180 tablet 3     magnesium oxide (MAG-OX) 400 mg tablet Take 400 mg by mouth daily.       metoprolol succinate (TOPROL-XL) 50 MG 24 hr tablet Take 50 mg by mouth daily.       multivitamin therapeutic (THERAGRAN) tablet Take 1 tablet by mouth daily.       PROAIR HFA 90 mcg/actuation inhaler INHALE 2 PUFFS BY MOUTH 2-4 TIMES DAILY AS NEEDED 25.5 Inhaler 2     simvastatin (ZOCOR) 20 MG tablet TAKE 1 TABLET DAILY WITH EVENING MEAL. 90 tablet 3     triamcinolone (KENALOG) 0.1 % cream Apply daily as needed 80 g 1     No current facility-administered medications for this visit.        Physical Exam:    Vitals:    03/23/17 1535   BP: 148/60   Patient Site: Left Arm   Patient Position: Sitting   Cuff Size: Adult Regular   Pulse: 62   Resp: 16   SpO2: 98%   Weight: 114 lb 0.5 oz (51.7 kg)   Height: 5' 3\" (1.6 m)     Wt Readings from Last 3 Encounters:   03/23/17 114 lb 0.5 oz (51.7 kg)   03/13/17 114 lb 14.4 oz (52.1 kg)   02/16/17 114 lb 9 oz (52 kg)     Body mass index is 20.2 kg/(m^2).    Constitutional:  Reveals a pleasant female, wearing hearing aids, in good spirits.  Vitals:  Per nursing notes.  HEENT:No cervical LAD, no thyromegaly,  conjunctiva is pink, no scleral icterus, TMs are visualized and normal bl, oropharynx is clear, no exudates,   Cardiac:  Regular rate and rhythm,no murmurs, rubs, or gallops.Legs without edema. Palpation of the radial pulse regular.  Lungs: Clear to " auscultation bl.  Respiratory effort normal.  No wheezes or rales  Abdomen:positive BS, soft, nontender, nondistended.  No hepato-splenomagaly  Skin:   Without rash, bruise, or palpable lesions.  Rheumatologic: Normal joints and nails of the hands.  Psychiatric: affect is bright, memory intact.  Anxiety is mild    Data Review:    Analysis and Summary of Old Records (2): Yes    Records Requested (1): No      Other History Summarized (from other people in the room) (2): No    Radiology Tests Summarized (XRAY/CT/MRI/DXA) (1): No    Labs Reviewed (1): Yes    Medicine Tests Reviewed (EKG/ECHO/COLONOSCOPY/EGD) (1): No    Independent Review of EKG or X-RAY (2): No     [phone]

## 2021-07-03 NOTE — ADDENDUM NOTE
Addendum Note by Farzad Marcos LPN at 5/9/2017  1:53 PM     Author: Farzad Marcos LPN Service: -- Author Type: Licensed Nurse    Filed: 5/9/2017  1:53 PM Encounter Date: 5/1/2017 Status: Signed    : Farzad Marcos LPN (Licensed Nurse)    Addended by: FARZAD MARCOS on: 5/9/2017 01:53 PM        Modules accepted: Orders

## 2021-07-03 NOTE — ADDENDUM NOTE
Addendum Note by Farzad Marcos LPN at 4/13/2017  9:33 AM     Author: Farzad Marcos LPN Service: -- Author Type: Licensed Nurse    Filed: 4/13/2017  9:33 AM Encounter Date: 4/12/2017 Status: Signed    : Farzad Marcos LPN (Licensed Nurse)    Addended by: FARZAD MARCOS on: 4/13/2017 09:33 AM        Modules accepted: Orders

## 2021-07-24 ENCOUNTER — HEALTH MAINTENANCE LETTER (OUTPATIENT)
Age: 86
End: 2021-07-24

## 2021-09-18 ENCOUNTER — HEALTH MAINTENANCE LETTER (OUTPATIENT)
Age: 86
End: 2021-09-18

## 2022-01-01 ENCOUNTER — APPOINTMENT (OUTPATIENT)
Dept: OCCUPATIONAL THERAPY | Facility: CLINIC | Age: 87
DRG: 511 | End: 2022-01-01
Payer: MEDICARE

## 2022-01-01 ENCOUNTER — ANESTHESIA (OUTPATIENT)
Dept: SURGERY | Facility: CLINIC | Age: 87
DRG: 511 | End: 2022-01-01
Payer: MEDICARE

## 2022-01-01 ENCOUNTER — APPOINTMENT (OUTPATIENT)
Dept: GENERAL RADIOLOGY | Facility: CLINIC | Age: 87
DRG: 511 | End: 2022-01-01
Attending: ORTHOPAEDIC SURGERY
Payer: MEDICARE

## 2022-01-01 ENCOUNTER — LAB REQUISITION (OUTPATIENT)
Dept: LAB | Facility: CLINIC | Age: 87
End: 2022-01-01

## 2022-01-01 ENCOUNTER — HEALTH MAINTENANCE LETTER (OUTPATIENT)
Age: 87
End: 2022-01-01

## 2022-01-01 ENCOUNTER — APPOINTMENT (OUTPATIENT)
Dept: GENERAL RADIOLOGY | Facility: CLINIC | Age: 87
DRG: 511 | End: 2022-01-01
Attending: EMERGENCY MEDICINE
Payer: MEDICARE

## 2022-01-01 ENCOUNTER — LAB REQUISITION (OUTPATIENT)
Dept: LAB | Facility: CLINIC | Age: 87
End: 2022-01-01
Payer: MEDICARE

## 2022-01-01 ENCOUNTER — PATIENT OUTREACH (OUTPATIENT)
Dept: CARE COORDINATION | Facility: CLINIC | Age: 87
End: 2022-01-01

## 2022-01-01 ENCOUNTER — ANESTHESIA EVENT (OUTPATIENT)
Dept: SURGERY | Facility: CLINIC | Age: 87
DRG: 511 | End: 2022-01-01
Payer: MEDICARE

## 2022-01-01 ENCOUNTER — HOSPITAL ENCOUNTER (INPATIENT)
Facility: CLINIC | Age: 87
LOS: 6 days | Discharge: NURSING, REHAB OR LONG-TERM CARE | DRG: 511 | End: 2022-07-13
Attending: EMERGENCY MEDICINE | Admitting: INTERNAL MEDICINE
Payer: MEDICARE

## 2022-01-01 ENCOUNTER — HOSPITAL ENCOUNTER (EMERGENCY)
Facility: CLINIC | Age: 87
End: 2022-01-01
Payer: MEDICARE

## 2022-01-01 VITALS
BODY MASS INDEX: 26.03 KG/M2 | HEIGHT: 60 IN | OXYGEN SATURATION: 91 % | SYSTOLIC BLOOD PRESSURE: 144 MMHG | DIASTOLIC BLOOD PRESSURE: 48 MMHG | HEART RATE: 60 BPM | WEIGHT: 132.6 LBS | TEMPERATURE: 98 F | RESPIRATION RATE: 18 BRPM

## 2022-01-01 VITALS
OXYGEN SATURATION: 94 % | DIASTOLIC BLOOD PRESSURE: 52 MMHG | SYSTOLIC BLOOD PRESSURE: 134 MMHG | HEART RATE: 74 BPM | RESPIRATION RATE: 20 BRPM | TEMPERATURE: 98.1 F

## 2022-01-01 DIAGNOSIS — I10 ESSENTIAL HYPERTENSION: Primary | ICD-10-CM

## 2022-01-01 DIAGNOSIS — Z71.89 OTHER SPECIFIED COUNSELING: ICD-10-CM

## 2022-01-01 DIAGNOSIS — Z20.822 CONTACT WITH AND (SUSPECTED) EXPOSURE TO COVID-19: ICD-10-CM

## 2022-01-01 DIAGNOSIS — S52.502B TYPE I OR II OPEN FRACTURE OF DISTAL END OF LEFT RADIUS, UNSPECIFIED FRACTURE MORPHOLOGY, INITIAL ENCOUNTER: ICD-10-CM

## 2022-01-01 DIAGNOSIS — H35.30 MACULAR DEGENERATION (SENILE) OF RETINA: ICD-10-CM

## 2022-01-01 DIAGNOSIS — R79.89 OTHER SPECIFIED ABNORMAL FINDINGS OF BLOOD CHEMISTRY: ICD-10-CM

## 2022-01-01 DIAGNOSIS — S52.602B TYPE I OR II OPEN FRACTURE OF DISTAL END OF LEFT ULNA, UNSPECIFIED FRACTURE MORPHOLOGY, INITIAL ENCOUNTER: ICD-10-CM

## 2022-01-01 DIAGNOSIS — R68.89 OTHER GENERAL SYMPTOMS AND SIGNS: ICD-10-CM

## 2022-01-01 LAB
ANION GAP SERPL CALCULATED.3IONS-SCNC: 4 MMOL/L (ref 3–14)
ANION GAP SERPL CALCULATED.3IONS-SCNC: 5 MMOL/L (ref 3–14)
ANION GAP SERPL CALCULATED.3IONS-SCNC: 5 MMOL/L (ref 3–14)
ANION GAP SERPL CALCULATED.3IONS-SCNC: 7 MMOL/L (ref 7–15)
ATRIAL RATE - MUSE: 75 BPM
BASOPHILS # BLD AUTO: 0.1 10E3/UL (ref 0–0.2)
BASOPHILS NFR BLD AUTO: 1 %
BUN SERPL-MCNC: 13.7 MG/DL (ref 8–23)
BUN SERPL-MCNC: 16 MG/DL (ref 7–30)
BUN SERPL-MCNC: 22 MG/DL (ref 7–30)
BUN SERPL-MCNC: 8 MG/DL (ref 7–30)
CALCIUM SERPL-MCNC: 8.1 MG/DL (ref 8.5–10.1)
CALCIUM SERPL-MCNC: 8.6 MG/DL (ref 8.5–10.1)
CALCIUM SERPL-MCNC: 9 MG/DL (ref 8.2–9.6)
CALCIUM SERPL-MCNC: 9.2 MG/DL (ref 8.5–10.1)
CALCIUM SERPL-MCNC: 9.3 MG/DL (ref 8.5–10.1)
CHLORIDE BLD-SCNC: 103 MMOL/L (ref 94–109)
CHLORIDE BLD-SCNC: 107 MMOL/L (ref 94–109)
CHLORIDE BLD-SCNC: 99 MMOL/L (ref 94–109)
CHLORIDE SERPL-SCNC: 104 MMOL/L (ref 98–107)
CO2 SERPL-SCNC: 29 MMOL/L (ref 20–32)
CO2 SERPL-SCNC: 29 MMOL/L (ref 20–32)
CO2 SERPL-SCNC: 31 MMOL/L (ref 20–32)
CREAT SERPL-MCNC: 0.49 MG/DL (ref 0.52–1.04)
CREAT SERPL-MCNC: 0.64 MG/DL (ref 0.52–1.04)
CREAT SERPL-MCNC: 0.64 MG/DL (ref 0.52–1.04)
CREAT SERPL-MCNC: 0.8 MG/DL (ref 0.51–0.95)
DEPRECATED HCO3 PLAS-SCNC: 30 MMOL/L (ref 22–29)
DIASTOLIC BLOOD PRESSURE - MUSE: NORMAL MMHG
EOSINOPHIL # BLD AUTO: 2.3 10E3/UL (ref 0–0.7)
EOSINOPHIL NFR BLD AUTO: 22 %
ERYTHROCYTE [DISTWIDTH] IN BLOOD BY AUTOMATED COUNT: 12.9 % (ref 10–15)
ERYTHROCYTE [DISTWIDTH] IN BLOOD BY AUTOMATED COUNT: 13.1 % (ref 10–15)
ERYTHROCYTE [DISTWIDTH] IN BLOOD BY AUTOMATED COUNT: 13.1 % (ref 10–15)
ERYTHROCYTE [DISTWIDTH] IN BLOOD BY AUTOMATED COUNT: 13.6 % (ref 10–15)
GFR SERPL CREATININE-BSD FRML MDRD: 68 ML/MIN/1.73M2
GFR SERPL CREATININE-BSD FRML MDRD: 81 ML/MIN/1.73M2
GFR SERPL CREATININE-BSD FRML MDRD: 81 ML/MIN/1.73M2
GFR SERPL CREATININE-BSD FRML MDRD: 87 ML/MIN/1.73M2
GLUCOSE BLD-MCNC: 136 MG/DL (ref 70–99)
GLUCOSE BLD-MCNC: 142 MG/DL (ref 70–99)
GLUCOSE BLD-MCNC: 142 MG/DL (ref 70–99)
GLUCOSE BLD-MCNC: 86 MG/DL (ref 70–99)
GLUCOSE BLDC GLUCOMTR-MCNC: 104 MG/DL (ref 70–99)
GLUCOSE BLDC GLUCOMTR-MCNC: 124 MG/DL (ref 70–99)
GLUCOSE BLDC GLUCOMTR-MCNC: 129 MG/DL (ref 70–99)
GLUCOSE SERPL-MCNC: 96 MG/DL (ref 70–99)
HCT VFR BLD AUTO: 34 % (ref 35–47)
HCT VFR BLD AUTO: 35 % (ref 35–47)
HCT VFR BLD AUTO: 38.7 % (ref 35–47)
HCT VFR BLD AUTO: 44.9 % (ref 35–47)
HGB BLD-MCNC: 10.6 G/DL (ref 11.7–15.7)
HGB BLD-MCNC: 11.1 G/DL (ref 11.7–15.7)
HGB BLD-MCNC: 11.3 G/DL (ref 11.7–15.7)
HGB BLD-MCNC: 12 G/DL (ref 11.7–15.7)
HGB BLD-MCNC: 13.9 G/DL (ref 11.7–15.7)
IMM GRANULOCYTES # BLD: 0 10E3/UL
IMM GRANULOCYTES NFR BLD: 0 %
INTERPRETATION ECG - MUSE: NORMAL
LYMPHOCYTES # BLD AUTO: 1.6 10E3/UL (ref 0.8–5.3)
LYMPHOCYTES NFR BLD AUTO: 15 %
MAGNESIUM SERPL-MCNC: 1.8 MG/DL (ref 1.6–2.3)
MAGNESIUM SERPL-MCNC: 1.9 MG/DL (ref 1.6–2.3)
MAGNESIUM SERPL-MCNC: 2.1 MG/DL (ref 1.6–2.3)
MAGNESIUM SERPL-MCNC: 2.3 MG/DL (ref 1.6–2.3)
MAGNESIUM SERPL-MCNC: 2.3 MG/DL (ref 1.6–2.3)
MCH RBC QN AUTO: 28.6 PG (ref 26.5–33)
MCH RBC QN AUTO: 28.8 PG (ref 26.5–33)
MCH RBC QN AUTO: 28.8 PG (ref 26.5–33)
MCH RBC QN AUTO: 29.1 PG (ref 26.5–33)
MCHC RBC AUTO-ENTMCNC: 31 G/DL (ref 31.5–36.5)
MCHC RBC AUTO-ENTMCNC: 31 G/DL (ref 31.5–36.5)
MCHC RBC AUTO-ENTMCNC: 31.2 G/DL (ref 31.5–36.5)
MCHC RBC AUTO-ENTMCNC: 31.7 G/DL (ref 31.5–36.5)
MCV RBC AUTO: 91 FL (ref 78–100)
MCV RBC AUTO: 92 FL (ref 78–100)
MCV RBC AUTO: 93 FL (ref 78–100)
MCV RBC AUTO: 93 FL (ref 78–100)
MONOCYTES # BLD AUTO: 0.9 10E3/UL (ref 0–1.3)
MONOCYTES NFR BLD AUTO: 9 %
NEUTROPHILS # BLD AUTO: 5.7 10E3/UL (ref 1.6–8.3)
NEUTROPHILS NFR BLD AUTO: 53 %
NRBC # BLD AUTO: 0 10E3/UL
NRBC BLD AUTO-RTO: 0 /100
P AXIS - MUSE: 62 DEGREES
PLATELET # BLD AUTO: 174 10E3/UL (ref 150–450)
PLATELET # BLD AUTO: 192 10E3/UL (ref 150–450)
PLATELET # BLD AUTO: 227 10E3/UL (ref 150–450)
PLATELET # BLD AUTO: 320 10E3/UL (ref 150–450)
POTASSIUM BLD-SCNC: 3.6 MMOL/L (ref 3.4–5.3)
POTASSIUM BLD-SCNC: 3.7 MMOL/L (ref 3.4–5.3)
POTASSIUM BLD-SCNC: 3.8 MMOL/L (ref 3.4–5.3)
POTASSIUM BLD-SCNC: 3.9 MMOL/L (ref 3.4–5.3)
POTASSIUM BLD-SCNC: 3.9 MMOL/L (ref 3.4–5.3)
POTASSIUM BLD-SCNC: 4.1 MMOL/L (ref 3.4–5.3)
POTASSIUM BLD-SCNC: 4.6 MMOL/L (ref 3.4–5.3)
POTASSIUM SERPL-SCNC: 4.7 MMOL/L (ref 3.4–5.3)
PR INTERVAL - MUSE: 174 MS
QRS DURATION - MUSE: 144 MS
QT - MUSE: 448 MS
QTC - MUSE: 500 MS
R AXIS - MUSE: -41 DEGREES
RBC # BLD AUTO: 3.64 10E6/UL (ref 3.8–5.2)
RBC # BLD AUTO: 3.86 10E6/UL (ref 3.8–5.2)
RBC # BLD AUTO: 4.19 10E6/UL (ref 3.8–5.2)
RBC # BLD AUTO: 4.82 10E6/UL (ref 3.8–5.2)
SARS-COV-2 RNA RESP QL NAA+PROBE: NEGATIVE
SODIUM SERPL-SCNC: 133 MMOL/L (ref 133–144)
SODIUM SERPL-SCNC: 138 MMOL/L (ref 133–144)
SODIUM SERPL-SCNC: 141 MMOL/L (ref 133–144)
SODIUM SERPL-SCNC: 141 MMOL/L (ref 136–145)
SYSTOLIC BLOOD PRESSURE - MUSE: NORMAL MMHG
T AXIS - MUSE: 99 DEGREES
VENTRICULAR RATE- MUSE: 75 BPM
WBC # BLD AUTO: 10.7 10E3/UL (ref 4–11)
WBC # BLD AUTO: 7.1 10E3/UL (ref 4–11)
WBC # BLD AUTO: 9.3 10E3/UL (ref 4–11)
WBC # BLD AUTO: 9.6 10E3/UL (ref 4–11)

## 2022-01-01 PROCEDURE — U0003 INFECTIOUS AGENT DETECTION BY NUCLEIC ACID (DNA OR RNA); SEVERE ACUTE RESPIRATORY SYNDROME CORONAVIRUS 2 (SARS-COV-2) (CORONAVIRUS DISEASE [COVID-19]), AMPLIFIED PROBE TECHNIQUE, MAKING USE OF HIGH THROUGHPUT TECHNOLOGIES AS DESCRIBED BY CMS-2020-01-R: HCPCS | Performed by: INTERNAL MEDICINE

## 2022-01-01 PROCEDURE — 97530 THERAPEUTIC ACTIVITIES: CPT | Mod: GO

## 2022-01-01 PROCEDURE — 83735 ASSAY OF MAGNESIUM: CPT | Performed by: INTERNAL MEDICINE

## 2022-01-01 PROCEDURE — P9604 ONE-WAY ALLOW PRORATED TRIP: HCPCS | Performed by: FAMILY MEDICINE

## 2022-01-01 PROCEDURE — 0PSJ04Z REPOSITION LEFT RADIUS WITH INTERNAL FIXATION DEVICE, OPEN APPROACH: ICD-10-PCS | Performed by: ORTHOPAEDIC SURGERY

## 2022-01-01 PROCEDURE — 250N000013 HC RX MED GY IP 250 OP 250 PS 637: Performed by: INTERNAL MEDICINE

## 2022-01-01 PROCEDURE — 250N000013 HC RX MED GY IP 250 OP 250 PS 637: Performed by: EMERGENCY MEDICINE

## 2022-01-01 PROCEDURE — 99232 SBSQ HOSP IP/OBS MODERATE 35: CPT | Performed by: INTERNAL MEDICINE

## 2022-01-01 PROCEDURE — 93005 ELECTROCARDIOGRAM TRACING: CPT

## 2022-01-01 PROCEDURE — 258N000003 HC RX IP 258 OP 636: Performed by: PHYSICIAN ASSISTANT

## 2022-01-01 PROCEDURE — 99239 HOSP IP/OBS DSCHRG MGMT >30: CPT | Performed by: INTERNAL MEDICINE

## 2022-01-01 PROCEDURE — 84132 ASSAY OF SERUM POTASSIUM: CPT | Performed by: INTERNAL MEDICINE

## 2022-01-01 PROCEDURE — 36415 COLL VENOUS BLD VENIPUNCTURE: CPT | Performed by: PHYSICIAN ASSISTANT

## 2022-01-01 PROCEDURE — 258N000003 HC RX IP 258 OP 636: Performed by: NURSE ANESTHETIST, CERTIFIED REGISTERED

## 2022-01-01 PROCEDURE — 36415 COLL VENOUS BLD VENIPUNCTURE: CPT | Performed by: INTERNAL MEDICINE

## 2022-01-01 PROCEDURE — 999N000179 XR SURGERY CARM FLUORO LESS THAN 5 MIN W STILLS: Mod: TC

## 2022-01-01 PROCEDURE — 99285 EMERGENCY DEPT VISIT HI MDM: CPT | Mod: 25

## 2022-01-01 PROCEDURE — 82947 ASSAY GLUCOSE BLOOD QUANT: CPT | Performed by: INTERNAL MEDICINE

## 2022-01-01 PROCEDURE — 82310 ASSAY OF CALCIUM: CPT | Performed by: FAMILY MEDICINE

## 2022-01-01 PROCEDURE — 250N000013 HC RX MED GY IP 250 OP 250 PS 637: Performed by: PHYSICIAN ASSISTANT

## 2022-01-01 PROCEDURE — 999N000065 XR WRIST LEFT 2 VIEW: Mod: LT

## 2022-01-01 PROCEDURE — U0003 INFECTIOUS AGENT DETECTION BY NUCLEIC ACID (DNA OR RNA); SEVERE ACUTE RESPIRATORY SYNDROME CORONAVIRUS 2 (SARS-COV-2) (CORONAVIRUS DISEASE [COVID-19]), AMPLIFIED PROBE TECHNIQUE, MAKING USE OF HIGH THROUGHPUT TECHNOLOGIES AS DESCRIBED BY CMS-2020-01-R: HCPCS | Performed by: EMERGENCY MEDICINE

## 2022-01-01 PROCEDURE — C1713 ANCHOR/SCREW BN/BN,TIS/BN: HCPCS | Performed by: ORTHOPAEDIC SURGERY

## 2022-01-01 PROCEDURE — 250N000009 HC RX 250: Performed by: ANESTHESIOLOGY

## 2022-01-01 PROCEDURE — 99233 SBSQ HOSP IP/OBS HIGH 50: CPT | Performed by: INTERNAL MEDICINE

## 2022-01-01 PROCEDURE — 97165 OT EVAL LOW COMPLEX 30 MIN: CPT | Mod: GO

## 2022-01-01 PROCEDURE — 120N000001 HC R&B MED SURG/OB

## 2022-01-01 PROCEDURE — 36415 COLL VENOUS BLD VENIPUNCTURE: CPT | Performed by: EMERGENCY MEDICINE

## 2022-01-01 PROCEDURE — C9803 HOPD COVID-19 SPEC COLLECT: HCPCS

## 2022-01-01 PROCEDURE — 999N000141 HC STATISTIC PRE-PROCEDURE NURSING ASSESSMENT: Performed by: ORTHOPAEDIC SURGERY

## 2022-01-01 PROCEDURE — 80048 BASIC METABOLIC PNL TOTAL CA: CPT | Performed by: INTERNAL MEDICINE

## 2022-01-01 PROCEDURE — 250N000011 HC RX IP 250 OP 636: Performed by: PHYSICIAN ASSISTANT

## 2022-01-01 PROCEDURE — 250N000013 HC RX MED GY IP 250 OP 250 PS 637: Performed by: HOSPITALIST

## 2022-01-01 PROCEDURE — 82310 ASSAY OF CALCIUM: CPT | Performed by: EMERGENCY MEDICINE

## 2022-01-01 PROCEDURE — 73100 X-RAY EXAM OF WRIST: CPT | Mod: LT

## 2022-01-01 PROCEDURE — 250N000009 HC RX 250: Performed by: NURSE ANESTHETIST, CERTIFIED REGISTERED

## 2022-01-01 PROCEDURE — 272N000001 HC OR GENERAL SUPPLY STERILE: Performed by: ORTHOPAEDIC SURGERY

## 2022-01-01 PROCEDURE — 97535 SELF CARE MNGMENT TRAINING: CPT | Mod: GO

## 2022-01-01 PROCEDURE — 258N000001 HC RX 258: Performed by: ORTHOPAEDIC SURGERY

## 2022-01-01 PROCEDURE — 25605 CLTX DST RDL FX/EPHYS SEP W/: CPT | Mod: LT

## 2022-01-01 PROCEDURE — 999N000147 HC STATISTIC PT IP EVAL DEFER

## 2022-01-01 PROCEDURE — 96365 THER/PROPH/DIAG IV INF INIT: CPT

## 2022-01-01 PROCEDURE — 370N000017 HC ANESTHESIA TECHNICAL FEE, PER MIN: Performed by: ORTHOPAEDIC SURGERY

## 2022-01-01 PROCEDURE — 97535 SELF CARE MNGMENT TRAINING: CPT | Mod: GO | Performed by: REHABILITATION PRACTITIONER

## 2022-01-01 PROCEDURE — 360N000084 HC SURGERY LEVEL 4 W/ FLUORO, PER MIN: Performed by: ORTHOPAEDIC SURGERY

## 2022-01-01 PROCEDURE — 85027 COMPLETE CBC AUTOMATED: CPT | Performed by: FAMILY MEDICINE

## 2022-01-01 PROCEDURE — 83735 ASSAY OF MAGNESIUM: CPT | Performed by: PHYSICIAN ASSISTANT

## 2022-01-01 PROCEDURE — 73562 X-RAY EXAM OF KNEE 3: CPT | Mod: LT

## 2022-01-01 PROCEDURE — 250N000011 HC RX IP 250 OP 636: Performed by: EMERGENCY MEDICINE

## 2022-01-01 PROCEDURE — 36415 COLL VENOUS BLD VENIPUNCTURE: CPT | Performed by: FAMILY MEDICINE

## 2022-01-01 PROCEDURE — 0PSJ34Z REPOSITION LEFT RADIUS WITH INTERNAL FIXATION DEVICE, PERCUTANEOUS APPROACH: ICD-10-PCS | Performed by: ORTHOPAEDIC SURGERY

## 2022-01-01 PROCEDURE — 85025 COMPLETE CBC W/AUTO DIFF WBC: CPT | Performed by: EMERGENCY MEDICINE

## 2022-01-01 PROCEDURE — 99223 1ST HOSP IP/OBS HIGH 75: CPT | Mod: AI | Performed by: PHYSICIAN ASSISTANT

## 2022-01-01 PROCEDURE — 85018 HEMOGLOBIN: CPT | Performed by: PHYSICIAN ASSISTANT

## 2022-01-01 PROCEDURE — 82310 ASSAY OF CALCIUM: CPT | Performed by: PHYSICIAN ASSISTANT

## 2022-01-01 PROCEDURE — 710N000009 HC RECOVERY PHASE 1, LEVEL 1, PER MIN: Performed by: ORTHOPAEDIC SURGERY

## 2022-01-01 PROCEDURE — 80048 BASIC METABOLIC PNL TOTAL CA: CPT | Performed by: PHYSICIAN ASSISTANT

## 2022-01-01 PROCEDURE — 85014 HEMATOCRIT: CPT | Performed by: PHYSICIAN ASSISTANT

## 2022-01-01 PROCEDURE — 85014 HEMATOCRIT: CPT | Performed by: INTERNAL MEDICINE

## 2022-01-01 PROCEDURE — 0PSL04Z REPOSITION LEFT ULNA WITH INTERNAL FIXATION DEVICE, OPEN APPROACH: ICD-10-PCS | Performed by: ORTHOPAEDIC SURGERY

## 2022-01-01 DEVICE — IMPLANTABLE DEVICE: Type: IMPLANTABLE DEVICE | Site: WRIST | Status: FUNCTIONAL

## 2022-01-01 DEVICE — IMP SCR SYN LCP CORTEX 2.4X14MM SELF TAP 201.764: Type: IMPLANTABLE DEVICE | Site: WRIST | Status: FUNCTIONAL

## 2022-01-01 DEVICE — IMP SCR SYN CAN T8 STARDRIVE 2.4X14MM VA-LCP ST 02.210.114: Type: IMPLANTABLE DEVICE | Site: WRIST | Status: FUNCTIONAL

## 2022-01-01 DEVICE — IMP SCR SYN CAN T8 STARDRIVE 2.4X16MM VA-LCP ST 02.210.116: Type: IMPLANTABLE DEVICE | Site: WRIST | Status: FUNCTIONAL

## 2022-01-01 DEVICE — IMP SCR SYN LCP DIST 2.4X16MM SELF TAP SS 212.816: Type: IMPLANTABLE DEVICE | Site: WRIST | Status: FUNCTIONAL

## 2022-01-01 DEVICE — IMP SCR SYN LCP DIST 2.4X14MM SELF TAP SS 212.814: Type: IMPLANTABLE DEVICE | Site: WRIST | Status: FUNCTIONAL

## 2022-01-01 RX ORDER — ALBUTEROL SULFATE 0.83 MG/ML
2.5 SOLUTION RESPIRATORY (INHALATION) EVERY 4 HOURS PRN
Status: DISCONTINUED | OUTPATIENT
Start: 2022-01-01 | End: 2022-01-01 | Stop reason: HOSPADM

## 2022-01-01 RX ORDER — AMOXICILLIN 250 MG
1 CAPSULE ORAL 2 TIMES DAILY PRN
DISCHARGE
Start: 2022-01-01

## 2022-01-01 RX ORDER — NALOXONE HYDROCHLORIDE 0.4 MG/ML
0.4 INJECTION, SOLUTION INTRAMUSCULAR; INTRAVENOUS; SUBCUTANEOUS
Status: DISCONTINUED | OUTPATIENT
Start: 2022-01-01 | End: 2022-01-01 | Stop reason: HOSPADM

## 2022-01-01 RX ORDER — BUPIVACAINE HYDROCHLORIDE AND EPINEPHRINE 5; 5 MG/ML; UG/ML
INJECTION, SOLUTION PERINEURAL PRN
Status: DISCONTINUED | OUTPATIENT
Start: 2022-01-01 | End: 2022-01-01

## 2022-01-01 RX ORDER — FENTANYL CITRATE 50 UG/ML
50 INJECTION, SOLUTION INTRAMUSCULAR; INTRAVENOUS EVERY 5 MIN PRN
Status: DISCONTINUED | OUTPATIENT
Start: 2022-01-01 | End: 2022-01-01 | Stop reason: HOSPADM

## 2022-01-01 RX ORDER — PROCHLORPERAZINE MALEATE 5 MG
5 TABLET ORAL EVERY 6 HOURS PRN
Status: DISCONTINUED | OUTPATIENT
Start: 2022-01-01 | End: 2022-01-01 | Stop reason: HOSPADM

## 2022-01-01 RX ORDER — SODIUM CHLORIDE, SODIUM LACTATE, POTASSIUM CHLORIDE, CALCIUM CHLORIDE 600; 310; 30; 20 MG/100ML; MG/100ML; MG/100ML; MG/100ML
INJECTION, SOLUTION INTRAVENOUS CONTINUOUS
Status: DISCONTINUED | OUTPATIENT
Start: 2022-01-01 | End: 2022-01-01 | Stop reason: HOSPADM

## 2022-01-01 RX ORDER — AMOXICILLIN 250 MG
1 CAPSULE ORAL 2 TIMES DAILY PRN
Status: DISCONTINUED | OUTPATIENT
Start: 2022-01-01 | End: 2022-01-01 | Stop reason: HOSPADM

## 2022-01-01 RX ORDER — LOSARTAN POTASSIUM 50 MG/1
50 TABLET ORAL DAILY
DISCHARGE
Start: 2022-01-01

## 2022-01-01 RX ORDER — CEFAZOLIN SODIUM 2 G/100ML
2 INJECTION, SOLUTION INTRAVENOUS EVERY 8 HOURS
Status: DISCONTINUED | OUTPATIENT
Start: 2022-01-01 | End: 2022-01-01

## 2022-01-01 RX ORDER — CARVEDILOL 12.5 MG/1
12.5 TABLET ORAL 2 TIMES DAILY WITH MEALS
Status: DISCONTINUED | OUTPATIENT
Start: 2022-01-01 | End: 2022-01-01

## 2022-01-01 RX ORDER — CARVEDILOL 25 MG/1
25 TABLET ORAL 2 TIMES DAILY WITH MEALS
Status: DISCONTINUED | OUTPATIENT
Start: 2022-01-01 | End: 2022-01-01 | Stop reason: HOSPADM

## 2022-01-01 RX ORDER — HYDROXYZINE HYDROCHLORIDE 10 MG/1
10 TABLET, FILM COATED ORAL EVERY 6 HOURS PRN
Qty: 30 TABLET | Refills: 0 | Status: SHIPPED | OUTPATIENT
Start: 2022-01-01

## 2022-01-01 RX ORDER — ACETAMINOPHEN 325 MG/1
975 TABLET ORAL EVERY 8 HOURS
Status: DISCONTINUED | OUTPATIENT
Start: 2022-01-01 | End: 2022-01-01 | Stop reason: HOSPADM

## 2022-01-01 RX ORDER — CEFAZOLIN SODIUM/WATER 2 G/20 ML
2 SYRINGE (ML) INTRAVENOUS
Status: DISCONTINUED | OUTPATIENT
Start: 2022-01-01 | End: 2022-01-01 | Stop reason: HOSPADM

## 2022-01-01 RX ORDER — CEFAZOLIN SODIUM/WATER 2 G/20 ML
2 SYRINGE (ML) INTRAVENOUS SEE ADMIN INSTRUCTIONS
Status: DISCONTINUED | OUTPATIENT
Start: 2022-01-01 | End: 2022-01-01 | Stop reason: HOSPADM

## 2022-01-01 RX ORDER — SODIUM CHLORIDE, SODIUM LACTATE, POTASSIUM CHLORIDE, CALCIUM CHLORIDE 600; 310; 30; 20 MG/100ML; MG/100ML; MG/100ML; MG/100ML
INJECTION, SOLUTION INTRAVENOUS CONTINUOUS
Status: DISCONTINUED | OUTPATIENT
Start: 2022-01-01 | End: 2022-01-01

## 2022-01-01 RX ORDER — PROCHLORPERAZINE 25 MG
12.5 SUPPOSITORY, RECTAL RECTAL EVERY 12 HOURS PRN
Status: DISCONTINUED | OUTPATIENT
Start: 2022-01-01 | End: 2022-01-01 | Stop reason: HOSPADM

## 2022-01-01 RX ORDER — ONDANSETRON 4 MG/1
4 TABLET, ORALLY DISINTEGRATING ORAL EVERY 6 HOURS PRN
Status: DISCONTINUED | OUTPATIENT
Start: 2022-01-01 | End: 2022-01-01 | Stop reason: HOSPADM

## 2022-01-01 RX ORDER — MULTIVIT WITH MINERALS/LUTEIN
1 TABLET ORAL DAILY
COMMUNITY

## 2022-01-01 RX ORDER — ACETAMINOPHEN 325 MG/1
650 TABLET ORAL ONCE
Status: COMPLETED | OUTPATIENT
Start: 2022-01-01 | End: 2022-01-01

## 2022-01-01 RX ORDER — AMLODIPINE BESYLATE 10 MG/1
10 TABLET ORAL EVERY EVENING
COMMUNITY

## 2022-01-01 RX ORDER — CEFAZOLIN SODIUM 1 G/3ML
1 INJECTION, POWDER, FOR SOLUTION INTRAMUSCULAR; INTRAVENOUS EVERY 8 HOURS
Status: COMPLETED | OUTPATIENT
Start: 2022-01-01 | End: 2022-01-01

## 2022-01-01 RX ORDER — LOSARTAN POTASSIUM 100 MG/1
100 TABLET ORAL DAILY
Status: ON HOLD | COMMUNITY
End: 2022-01-01

## 2022-01-01 RX ORDER — ALBUTEROL SULFATE 90 UG/1
1-2 AEROSOL, METERED RESPIRATORY (INHALATION) EVERY 6 HOURS PRN
Status: DISCONTINUED | OUTPATIENT
Start: 2022-01-01 | End: 2022-01-01 | Stop reason: HOSPADM

## 2022-01-01 RX ORDER — ONDANSETRON 4 MG/1
4 TABLET, ORALLY DISINTEGRATING ORAL EVERY 30 MIN PRN
Status: DISCONTINUED | OUTPATIENT
Start: 2022-01-01 | End: 2022-01-01 | Stop reason: HOSPADM

## 2022-01-01 RX ORDER — LIDOCAINE 40 MG/G
CREAM TOPICAL
Status: DISCONTINUED | OUTPATIENT
Start: 2022-01-01 | End: 2022-01-01 | Stop reason: HOSPADM

## 2022-01-01 RX ORDER — MIRTAZAPINE 15 MG/1
15 TABLET, FILM COATED ORAL AT BEDTIME
COMMUNITY

## 2022-01-01 RX ORDER — DOCUSATE SODIUM 100 MG/1
200 CAPSULE, LIQUID FILLED ORAL EVERY EVENING
COMMUNITY

## 2022-01-01 RX ORDER — LIDOCAINE HYDROCHLORIDE 20 MG/ML
INJECTION, SOLUTION INFILTRATION; PERINEURAL PRN
Status: DISCONTINUED | OUTPATIENT
Start: 2022-01-01 | End: 2022-01-01

## 2022-01-01 RX ORDER — OXYCODONE HYDROCHLORIDE 5 MG/1
2.5-5 TABLET ORAL EVERY 4 HOURS PRN
Qty: 20 TABLET | Refills: 0 | Status: SHIPPED | OUTPATIENT
Start: 2022-01-01

## 2022-01-01 RX ORDER — CEFAZOLIN SODIUM 2 G/100ML
2 INJECTION, SOLUTION INTRAVENOUS ONCE
Status: COMPLETED | OUTPATIENT
Start: 2022-01-01 | End: 2022-01-01

## 2022-01-01 RX ORDER — LIDOCAINE 40 MG/G
CREAM TOPICAL
Status: DISCONTINUED | OUTPATIENT
Start: 2022-01-01 | End: 2022-01-01

## 2022-01-01 RX ORDER — ONDANSETRON 2 MG/ML
4 INJECTION INTRAMUSCULAR; INTRAVENOUS EVERY 6 HOURS PRN
Status: DISCONTINUED | OUTPATIENT
Start: 2022-01-01 | End: 2022-01-01 | Stop reason: HOSPADM

## 2022-01-01 RX ORDER — MIRTAZAPINE 15 MG/1
15 TABLET, FILM COATED ORAL AT BEDTIME
Status: DISCONTINUED | OUTPATIENT
Start: 2022-01-01 | End: 2022-01-01 | Stop reason: HOSPADM

## 2022-01-01 RX ORDER — FLUTICASONE PROPIONATE 110 UG/1
1 AEROSOL, METERED RESPIRATORY (INHALATION) 2 TIMES DAILY
COMMUNITY

## 2022-01-01 RX ORDER — ATORVASTATIN CALCIUM 10 MG/1
10 TABLET, FILM COATED ORAL DAILY
Status: DISCONTINUED | OUTPATIENT
Start: 2022-01-01 | End: 2022-01-01 | Stop reason: HOSPADM

## 2022-01-01 RX ORDER — SERTRALINE HYDROCHLORIDE 100 MG/1
100 TABLET, FILM COATED ORAL DAILY
Status: DISCONTINUED | OUTPATIENT
Start: 2022-01-01 | End: 2022-01-01 | Stop reason: HOSPADM

## 2022-01-01 RX ORDER — POLYETHYLENE GLYCOL 3350 17 G/17G
17 POWDER, FOR SOLUTION ORAL DAILY
DISCHARGE
Start: 2022-01-01

## 2022-01-01 RX ORDER — AMLODIPINE BESYLATE 5 MG/1
5 TABLET ORAL ONCE
Status: COMPLETED | OUTPATIENT
Start: 2022-01-01 | End: 2022-01-01

## 2022-01-01 RX ORDER — HYDRALAZINE HYDROCHLORIDE 20 MG/ML
5-10 INJECTION INTRAMUSCULAR; INTRAVENOUS EVERY 10 MIN PRN
Status: DISCONTINUED | OUTPATIENT
Start: 2022-01-01 | End: 2022-01-01 | Stop reason: HOSPADM

## 2022-01-01 RX ORDER — HYDRALAZINE HYDROCHLORIDE 20 MG/ML
10 INJECTION INTRAMUSCULAR; INTRAVENOUS EVERY 4 HOURS PRN
Status: DISCONTINUED | OUTPATIENT
Start: 2022-01-01 | End: 2022-01-01 | Stop reason: HOSPADM

## 2022-01-01 RX ORDER — FEXOFENADINE HCL 60 MG/1
60 TABLET, FILM COATED ORAL DAILY
Status: DISCONTINUED | OUTPATIENT
Start: 2022-01-01 | End: 2022-01-01 | Stop reason: HOSPADM

## 2022-01-01 RX ORDER — OXYCODONE HYDROCHLORIDE 5 MG/1
5 TABLET ORAL EVERY 4 HOURS PRN
Status: DISCONTINUED | OUTPATIENT
Start: 2022-01-01 | End: 2022-01-01 | Stop reason: HOSPADM

## 2022-01-01 RX ORDER — NALOXONE HYDROCHLORIDE 0.4 MG/ML
0.2 INJECTION, SOLUTION INTRAMUSCULAR; INTRAVENOUS; SUBCUTANEOUS
Status: DISCONTINUED | OUTPATIENT
Start: 2022-01-01 | End: 2022-01-01 | Stop reason: HOSPADM

## 2022-01-01 RX ORDER — LOSARTAN POTASSIUM 25 MG/1
25 TABLET ORAL DAILY
Status: DISCONTINUED | OUTPATIENT
Start: 2022-01-01 | End: 2022-01-01

## 2022-01-01 RX ORDER — SODIUM CHLORIDE, SODIUM LACTATE, POTASSIUM CHLORIDE, CALCIUM CHLORIDE 600; 310; 30; 20 MG/100ML; MG/100ML; MG/100ML; MG/100ML
INJECTION, SOLUTION INTRAVENOUS CONTINUOUS PRN
Status: DISCONTINUED | OUTPATIENT
Start: 2022-01-01 | End: 2022-01-01

## 2022-01-01 RX ORDER — AMLODIPINE BESYLATE 10 MG/1
10 TABLET ORAL DAILY
Status: DISCONTINUED | OUTPATIENT
Start: 2022-01-01 | End: 2022-01-01 | Stop reason: HOSPADM

## 2022-01-01 RX ORDER — ASPIRIN 81 MG/1
81 TABLET ORAL DAILY
Status: ON HOLD | COMMUNITY
End: 2022-01-01

## 2022-01-01 RX ORDER — LORAZEPAM 2 MG/ML
.5-1 INJECTION INTRAMUSCULAR
Status: DISCONTINUED | OUTPATIENT
Start: 2022-01-01 | End: 2022-01-01 | Stop reason: HOSPADM

## 2022-01-01 RX ORDER — SENNOSIDES 8.6 MG
650 CAPSULE ORAL EVERY EVENING
Status: ON HOLD | COMMUNITY
End: 2022-01-01

## 2022-01-01 RX ORDER — CARVEDILOL 12.5 MG/1
12.5 TABLET ORAL 2 TIMES DAILY WITH MEALS
Status: ON HOLD | COMMUNITY
End: 2022-01-01

## 2022-01-01 RX ORDER — SENNOSIDES 8.6 MG
1300 CAPSULE ORAL EVERY MORNING
Status: ON HOLD | COMMUNITY
End: 2022-01-01

## 2022-01-01 RX ORDER — FENTANYL CITRATE 50 UG/ML
25 INJECTION, SOLUTION INTRAMUSCULAR; INTRAVENOUS
Status: DISCONTINUED | OUTPATIENT
Start: 2022-01-01 | End: 2022-01-01 | Stop reason: HOSPADM

## 2022-01-01 RX ORDER — CARVEDILOL 25 MG/1
25 TABLET ORAL 2 TIMES DAILY WITH MEALS
DISCHARGE
Start: 2022-01-01

## 2022-01-01 RX ORDER — CARVEDILOL 12.5 MG/1
12.5 TABLET ORAL ONCE
Status: COMPLETED | OUTPATIENT
Start: 2022-01-01 | End: 2022-01-01

## 2022-01-01 RX ORDER — ONDANSETRON 2 MG/ML
4 INJECTION INTRAMUSCULAR; INTRAVENOUS EVERY 30 MIN PRN
Status: DISCONTINUED | OUTPATIENT
Start: 2022-01-01 | End: 2022-01-01 | Stop reason: HOSPADM

## 2022-01-01 RX ORDER — LANOLIN ALCOHOL/MO/W.PET/CERES
3 CREAM (GRAM) TOPICAL
COMMUNITY

## 2022-01-01 RX ORDER — POLYETHYLENE GLYCOL 3350 17 G/17G
17 POWDER, FOR SOLUTION ORAL DAILY
Status: DISCONTINUED | OUTPATIENT
Start: 2022-01-01 | End: 2022-01-01 | Stop reason: HOSPADM

## 2022-01-01 RX ORDER — HYDROMORPHONE HYDROCHLORIDE 1 MG/ML
0.5 INJECTION, SOLUTION INTRAMUSCULAR; INTRAVENOUS; SUBCUTANEOUS EVERY 5 MIN PRN
Status: DISCONTINUED | OUTPATIENT
Start: 2022-01-01 | End: 2022-01-01 | Stop reason: HOSPADM

## 2022-01-01 RX ORDER — BUPIVACAINE HYDROCHLORIDE 5 MG/ML
INJECTION, SOLUTION PERINEURAL
Status: DISCONTINUED
Start: 2022-01-01 | End: 2022-01-01 | Stop reason: HOSPADM

## 2022-01-01 RX ORDER — AMLODIPINE BESYLATE 5 MG/1
5 TABLET ORAL DAILY
Status: DISCONTINUED | OUTPATIENT
Start: 2022-01-01 | End: 2022-01-01

## 2022-01-01 RX ORDER — ALBUTEROL SULFATE 90 UG/1
1-2 AEROSOL, METERED RESPIRATORY (INHALATION) EVERY 6 HOURS PRN
COMMUNITY

## 2022-01-01 RX ORDER — FEXOFENADINE HCL 180 MG/1
180 TABLET ORAL DAILY
COMMUNITY

## 2022-01-01 RX ORDER — ASPIRIN 325 MG
325 TABLET, DELAYED RELEASE (ENTERIC COATED) ORAL DAILY
Qty: 42 TABLET | Refills: 0 | Status: SHIPPED | OUTPATIENT
Start: 2022-01-01

## 2022-01-01 RX ORDER — MEPERIDINE HYDROCHLORIDE 25 MG/ML
12.5 INJECTION INTRAMUSCULAR; INTRAVENOUS; SUBCUTANEOUS
Status: DISCONTINUED | OUTPATIENT
Start: 2022-01-01 | End: 2022-01-01 | Stop reason: HOSPADM

## 2022-01-01 RX ORDER — LOSARTAN POTASSIUM 25 MG/1
25 TABLET ORAL ONCE
Status: COMPLETED | OUTPATIENT
Start: 2022-01-01 | End: 2022-01-01

## 2022-01-01 RX ORDER — AMOXICILLIN 250 MG
2 CAPSULE ORAL 2 TIMES DAILY PRN
Status: DISCONTINUED | OUTPATIENT
Start: 2022-01-01 | End: 2022-01-01 | Stop reason: HOSPADM

## 2022-01-01 RX ORDER — AFLIBERCEPT 40 MG/ML
2 INJECTION, SOLUTION INTRAVITREAL
Status: ON HOLD | COMMUNITY
End: 2022-01-01

## 2022-01-01 RX ORDER — HYDROXYZINE HYDROCHLORIDE 10 MG/1
10 TABLET, FILM COATED ORAL EVERY 6 HOURS PRN
Status: DISCONTINUED | OUTPATIENT
Start: 2022-01-01 | End: 2022-01-01 | Stop reason: HOSPADM

## 2022-01-01 RX ORDER — LOSARTAN POTASSIUM 50 MG/1
50 TABLET ORAL DAILY
Status: DISCONTINUED | OUTPATIENT
Start: 2022-01-01 | End: 2022-01-01 | Stop reason: HOSPADM

## 2022-01-01 RX ORDER — CARBOXYMETHYLCELLULOSE SODIUM 5 MG/ML
1 SOLUTION/ DROPS OPHTHALMIC
Status: DISCONTINUED | OUTPATIENT
Start: 2022-01-01 | End: 2022-01-01 | Stop reason: HOSPADM

## 2022-01-01 RX ORDER — ACETAMINOPHEN 325 MG/1
975 TABLET ORAL EVERY 8 HOURS
Qty: 50 TABLET | Refills: 0 | Status: SHIPPED | OUTPATIENT
Start: 2022-01-01

## 2022-01-01 RX ORDER — ATORVASTATIN CALCIUM 10 MG/1
10 TABLET, FILM COATED ORAL DAILY
COMMUNITY

## 2022-01-01 RX ORDER — AFLIBERCEPT 40 MG/ML
2 INJECTION, SOLUTION INTRAVITREAL
DISCHARGE
Start: 2022-01-01

## 2022-01-01 RX ADMIN — ACETAMINOPHEN 975 MG: 325 TABLET, FILM COATED ORAL at 09:33

## 2022-01-01 RX ADMIN — SODIUM CHLORIDE, POTASSIUM CHLORIDE, SODIUM LACTATE AND CALCIUM CHLORIDE: 600; 310; 30; 20 INJECTION, SOLUTION INTRAVENOUS at 23:47

## 2022-01-01 RX ADMIN — ACETAMINOPHEN 975 MG: 325 TABLET, FILM COATED ORAL at 02:21

## 2022-01-01 RX ADMIN — Medication 5 MG: at 21:19

## 2022-01-01 RX ADMIN — ACETAMINOPHEN 975 MG: 325 TABLET, FILM COATED ORAL at 02:33

## 2022-01-01 RX ADMIN — POLYETHYLENE GLYCOL 3350 17 G: 17 POWDER, FOR SOLUTION ORAL at 08:08

## 2022-01-01 RX ADMIN — ATORVASTATIN CALCIUM 10 MG: 10 TABLET, FILM COATED ORAL at 17:00

## 2022-01-01 RX ADMIN — SODIUM CHLORIDE, POTASSIUM CHLORIDE, SODIUM LACTATE AND CALCIUM CHLORIDE: 600; 310; 30; 20 INJECTION, SOLUTION INTRAVENOUS at 09:30

## 2022-01-01 RX ADMIN — SENNOSIDES AND DOCUSATE SODIUM 2 TABLET: 50; 8.6 TABLET ORAL at 21:26

## 2022-01-01 RX ADMIN — Medication 2 G: at 10:00

## 2022-01-01 RX ADMIN — ATORVASTATIN CALCIUM 10 MG: 10 TABLET, FILM COATED ORAL at 09:42

## 2022-01-01 RX ADMIN — ACETAMINOPHEN 975 MG: 325 TABLET, FILM COATED ORAL at 09:34

## 2022-01-01 RX ADMIN — LIDOCAINE HYDROCHLORIDE 5 ML: 20 INJECTION, SOLUTION INFILTRATION; PERINEURAL at 09:55

## 2022-01-01 RX ADMIN — Medication 1 MG: at 22:40

## 2022-01-01 RX ADMIN — CARVEDILOL 12.5 MG: 12.5 TABLET, FILM COATED ORAL at 09:42

## 2022-01-01 RX ADMIN — ACETAMINOPHEN 975 MG: 325 TABLET, FILM COATED ORAL at 10:55

## 2022-01-01 RX ADMIN — ACETAMINOPHEN 975 MG: 325 TABLET, FILM COATED ORAL at 11:35

## 2022-01-01 RX ADMIN — SODIUM CHLORIDE, POTASSIUM CHLORIDE, SODIUM LACTATE AND CALCIUM CHLORIDE: 600; 310; 30; 20 INJECTION, SOLUTION INTRAVENOUS at 02:30

## 2022-01-01 RX ADMIN — SENNOSIDES AND DOCUSATE SODIUM 2 TABLET: 50; 8.6 TABLET ORAL at 21:19

## 2022-01-01 RX ADMIN — ASPIRIN 325 MG: 325 TABLET, COATED ORAL at 08:23

## 2022-01-01 RX ADMIN — MIRTAZAPINE 15 MG: 15 TABLET, FILM COATED ORAL at 21:22

## 2022-01-01 RX ADMIN — CARVEDILOL 25 MG: 25 TABLET, FILM COATED ORAL at 18:18

## 2022-01-01 RX ADMIN — ACETAMINOPHEN 975 MG: 325 TABLET, FILM COATED ORAL at 02:41

## 2022-01-01 RX ADMIN — ASPIRIN 325 MG: 325 TABLET, COATED ORAL at 09:33

## 2022-01-01 RX ADMIN — BUPIVACAINE HYDROCHLORIDE AND EPINEPHRINE BITARTRATE 15 ML: 5; .005 INJECTION, SOLUTION EPIDURAL; INTRACAUDAL; PERINEURAL at 09:55

## 2022-01-01 RX ADMIN — ACETAMINOPHEN 975 MG: 325 TABLET, FILM COATED ORAL at 18:04

## 2022-01-01 RX ADMIN — FEXOFENADINE HYDROCHLORIDE 60 MG: 60 TABLET ORAL at 08:57

## 2022-01-01 RX ADMIN — ACETAMINOPHEN 975 MG: 325 TABLET, FILM COATED ORAL at 18:44

## 2022-01-01 RX ADMIN — SENNOSIDES AND DOCUSATE SODIUM 2 TABLET: 50; 8.6 TABLET ORAL at 22:40

## 2022-01-01 RX ADMIN — ACETAMINOPHEN 975 MG: 325 TABLET, FILM COATED ORAL at 01:42

## 2022-01-01 RX ADMIN — ATORVASTATIN CALCIUM 10 MG: 10 TABLET, FILM COATED ORAL at 08:05

## 2022-01-01 RX ADMIN — CARVEDILOL 12.5 MG: 12.5 TABLET, FILM COATED ORAL at 19:52

## 2022-01-01 RX ADMIN — MIRTAZAPINE 15 MG: 15 TABLET, FILM COATED ORAL at 21:19

## 2022-01-01 RX ADMIN — CARVEDILOL 12.5 MG: 12.5 TABLET, FILM COATED ORAL at 18:46

## 2022-01-01 RX ADMIN — CARVEDILOL 12.5 MG: 12.5 TABLET, FILM COATED ORAL at 10:40

## 2022-01-01 RX ADMIN — CARVEDILOL 12.5 MG: 12.5 TABLET, FILM COATED ORAL at 17:08

## 2022-01-01 RX ADMIN — OXYCODONE HYDROCHLORIDE 2.5 MG: 5 TABLET ORAL at 23:31

## 2022-01-01 RX ADMIN — FEXOFENADINE HYDROCHLORIDE 60 MG: 60 TABLET ORAL at 08:23

## 2022-01-01 RX ADMIN — BENZOCAINE 6 MG-MENTHOL 10 MG LOZENGES 1 LOZENGE: at 05:38

## 2022-01-01 RX ADMIN — CARVEDILOL 12.5 MG: 12.5 TABLET, FILM COATED ORAL at 11:35

## 2022-01-01 RX ADMIN — ACETAMINOPHEN 650 MG: 325 TABLET, FILM COATED ORAL at 19:37

## 2022-01-01 RX ADMIN — OXYCODONE HYDROCHLORIDE 2.5 MG: 5 TABLET ORAL at 22:35

## 2022-01-01 RX ADMIN — ATORVASTATIN CALCIUM 10 MG: 10 TABLET, FILM COATED ORAL at 09:34

## 2022-01-01 RX ADMIN — FEXOFENADINE HYDROCHLORIDE 60 MG: 60 TABLET ORAL at 17:01

## 2022-01-01 RX ADMIN — FEXOFENADINE HYDROCHLORIDE 60 MG: 60 TABLET ORAL at 11:35

## 2022-01-01 RX ADMIN — ASPIRIN 325 MG: 325 TABLET, COATED ORAL at 16:59

## 2022-01-01 RX ADMIN — Medication 5 MG: at 22:02

## 2022-01-01 RX ADMIN — MIRTAZAPINE 15 MG: 15 TABLET, FILM COATED ORAL at 22:02

## 2022-01-01 RX ADMIN — ASPIRIN 325 MG: 325 TABLET, COATED ORAL at 08:05

## 2022-01-01 RX ADMIN — SERTRALINE HYDROCHLORIDE 100 MG: 100 TABLET ORAL at 17:01

## 2022-01-01 RX ADMIN — FEXOFENADINE HYDROCHLORIDE 60 MG: 60 TABLET ORAL at 08:05

## 2022-01-01 RX ADMIN — AMLODIPINE BESYLATE 5 MG: 5 TABLET ORAL at 08:23

## 2022-01-01 RX ADMIN — LOSARTAN POTASSIUM 50 MG: 50 TABLET, FILM COATED ORAL at 08:22

## 2022-01-01 RX ADMIN — ONDANSETRON 4 MG: 4 TABLET, ORALLY DISINTEGRATING ORAL at 16:37

## 2022-01-01 RX ADMIN — ACETAMINOPHEN 975 MG: 325 TABLET, FILM COATED ORAL at 02:11

## 2022-01-01 RX ADMIN — ACETAMINOPHEN 975 MG: 325 TABLET, FILM COATED ORAL at 19:53

## 2022-01-01 RX ADMIN — ONDANSETRON 4 MG: 2 INJECTION INTRAMUSCULAR; INTRAVENOUS at 08:06

## 2022-01-01 RX ADMIN — LOSARTAN POTASSIUM 25 MG: 25 TABLET, FILM COATED ORAL at 09:42

## 2022-01-01 RX ADMIN — DEXMEDETOMIDINE HYDROCHLORIDE 0.5 MCG/KG/HR: 100 INJECTION, SOLUTION INTRAVENOUS at 10:04

## 2022-01-01 RX ADMIN — AMLODIPINE BESYLATE 10 MG: 10 TABLET ORAL at 08:57

## 2022-01-01 RX ADMIN — CARVEDILOL 25 MG: 25 TABLET, FILM COATED ORAL at 08:05

## 2022-01-01 RX ADMIN — CARVEDILOL 12.5 MG: 12.5 TABLET, FILM COATED ORAL at 08:22

## 2022-01-01 RX ADMIN — SERTRALINE HYDROCHLORIDE 100 MG: 100 TABLET ORAL at 09:33

## 2022-01-01 RX ADMIN — Medication 5 MG: at 22:32

## 2022-01-01 RX ADMIN — ASPIRIN 325 MG: 325 TABLET, COATED ORAL at 08:57

## 2022-01-01 RX ADMIN — SERTRALINE HYDROCHLORIDE 100 MG: 100 TABLET ORAL at 08:57

## 2022-01-01 RX ADMIN — CEFAZOLIN SODIUM 2 G: 2 INJECTION, SOLUTION INTRAVENOUS at 02:23

## 2022-01-01 RX ADMIN — CARVEDILOL 25 MG: 25 TABLET, FILM COATED ORAL at 08:57

## 2022-01-01 RX ADMIN — OXYCODONE HYDROCHLORIDE 2.5 MG: 5 TABLET ORAL at 22:41

## 2022-01-01 RX ADMIN — CEFAZOLIN 1 G: 1 INJECTION, POWDER, FOR SOLUTION INTRAMUSCULAR; INTRAVENOUS at 17:14

## 2022-01-01 RX ADMIN — SERTRALINE HYDROCHLORIDE 100 MG: 100 TABLET ORAL at 08:05

## 2022-01-01 RX ADMIN — SODIUM CHLORIDE, POTASSIUM CHLORIDE, SODIUM LACTATE AND CALCIUM CHLORIDE: 600; 310; 30; 20 INJECTION, SOLUTION INTRAVENOUS at 16:58

## 2022-01-01 RX ADMIN — POLYETHYLENE GLYCOL 3350 17 G: 17 POWDER, FOR SOLUTION ORAL at 09:31

## 2022-01-01 RX ADMIN — ASPIRIN 325 MG: 325 TABLET, COATED ORAL at 09:42

## 2022-01-01 RX ADMIN — POLYETHYLENE GLYCOL 3350 17 G: 17 POWDER, FOR SOLUTION ORAL at 09:41

## 2022-01-01 RX ADMIN — AMLODIPINE BESYLATE 5 MG: 5 TABLET ORAL at 09:42

## 2022-01-01 RX ADMIN — POLYETHYLENE GLYCOL 3350 17 G: 17 POWDER, FOR SOLUTION ORAL at 08:23

## 2022-01-01 RX ADMIN — LOSARTAN POTASSIUM 25 MG: 25 TABLET, FILM COATED ORAL at 11:35

## 2022-01-01 RX ADMIN — AMLODIPINE BESYLATE 5 MG: 5 TABLET ORAL at 09:33

## 2022-01-01 RX ADMIN — CEFAZOLIN SODIUM 2 G: 2 INJECTION, SOLUTION INTRAVENOUS at 18:26

## 2022-01-01 RX ADMIN — OXYCODONE HYDROCHLORIDE 2.5 MG: 5 TABLET ORAL at 06:43

## 2022-01-01 RX ADMIN — LOSARTAN POTASSIUM 50 MG: 50 TABLET, FILM COATED ORAL at 08:57

## 2022-01-01 RX ADMIN — ACETAMINOPHEN 975 MG: 325 TABLET, FILM COATED ORAL at 17:08

## 2022-01-01 RX ADMIN — SERTRALINE HYDROCHLORIDE 100 MG: 100 TABLET ORAL at 08:23

## 2022-01-01 RX ADMIN — CARVEDILOL 25 MG: 25 TABLET, FILM COATED ORAL at 18:04

## 2022-01-01 RX ADMIN — LOSARTAN POTASSIUM 50 MG: 50 TABLET, FILM COATED ORAL at 08:05

## 2022-01-01 RX ADMIN — ACETAMINOPHEN 975 MG: 325 TABLET, FILM COATED ORAL at 18:18

## 2022-01-01 RX ADMIN — ACETAMINOPHEN 975 MG: 325 TABLET, FILM COATED ORAL at 09:41

## 2022-01-01 RX ADMIN — ATORVASTATIN CALCIUM 10 MG: 10 TABLET, FILM COATED ORAL at 08:57

## 2022-01-01 RX ADMIN — FEXOFENADINE HYDROCHLORIDE 60 MG: 60 TABLET ORAL at 09:42

## 2022-01-01 RX ADMIN — Medication 1 MG: at 23:50

## 2022-01-01 RX ADMIN — AMLODIPINE BESYLATE 5 MG: 5 TABLET ORAL at 11:36

## 2022-01-01 RX ADMIN — HYDRALAZINE HYDROCHLORIDE 10 MG: 20 INJECTION INTRAMUSCULAR; INTRAVENOUS at 08:25

## 2022-01-01 RX ADMIN — POLYETHYLENE GLYCOL 3350 17 G: 17 POWDER, FOR SOLUTION ORAL at 08:57

## 2022-01-01 RX ADMIN — LOSARTAN POTASSIUM 25 MG: 25 TABLET, FILM COATED ORAL at 18:45

## 2022-01-01 RX ADMIN — SERTRALINE HYDROCHLORIDE 100 MG: 100 TABLET ORAL at 09:42

## 2022-01-01 RX ADMIN — CEFAZOLIN 1 G: 1 INJECTION, POWDER, FOR SOLUTION INTRAMUSCULAR; INTRAVENOUS at 02:31

## 2022-01-01 RX ADMIN — MIRTAZAPINE 15 MG: 15 TABLET, FILM COATED ORAL at 22:27

## 2022-01-01 RX ADMIN — ACETAMINOPHEN 975 MG: 325 TABLET, FILM COATED ORAL at 02:26

## 2022-01-01 RX ADMIN — AMLODIPINE BESYLATE 10 MG: 10 TABLET ORAL at 08:05

## 2022-01-01 RX ADMIN — Medication 5 MG: at 22:23

## 2022-01-01 RX ADMIN — ATORVASTATIN CALCIUM 10 MG: 10 TABLET, FILM COATED ORAL at 08:22

## 2022-01-01 RX ADMIN — Medication 1 DROP: at 22:40

## 2022-01-01 RX ADMIN — OXYCODONE HYDROCHLORIDE 2.5 MG: 5 TABLET ORAL at 04:55

## 2022-01-01 ASSESSMENT — ACTIVITIES OF DAILY LIVING (ADL)
ADLS_ACUITY_SCORE: 32
ADLS_ACUITY_SCORE: 34
ADLS_ACUITY_SCORE: 32
ADLS_ACUITY_SCORE: 34
ADLS_ACUITY_SCORE: 36
ADLS_ACUITY_SCORE: 31
ADLS_ACUITY_SCORE: 31
ADLS_ACUITY_SCORE: 32
ADLS_ACUITY_SCORE: 35
WALKING_OR_CLIMBING_STAIRS_DIFFICULTY: YES
ADLS_ACUITY_SCORE: 32
ADLS_ACUITY_SCORE: 32
ADLS_ACUITY_SCORE: 35
ADLS_ACUITY_SCORE: 31
VISION_MANAGEMENT: GLASSES
ADLS_ACUITY_SCORE: 31
ADLS_ACUITY_SCORE: 32
ADLS_ACUITY_SCORE: 31
ADLS_ACUITY_SCORE: 24
ADLS_ACUITY_SCORE: 36
ADLS_ACUITY_SCORE: 35
ADLS_ACUITY_SCORE: 31
ADLS_ACUITY_SCORE: 34
ADLS_ACUITY_SCORE: 31
ADLS_ACUITY_SCORE: 31
DEPENDENT_IADLS:: INDEPENDENT
ADLS_ACUITY_SCORE: 32
ADLS_ACUITY_SCORE: 35
FALL_HISTORY_WITHIN_LAST_SIX_MONTHS: NO
ADLS_ACUITY_SCORE: 36
ADLS_ACUITY_SCORE: 36
ADLS_ACUITY_SCORE: 31
DRESSING/BATHING_DIFFICULTY: NO
ADLS_ACUITY_SCORE: 31
ADLS_ACUITY_SCORE: 35
ADLS_ACUITY_SCORE: 31
ADLS_ACUITY_SCORE: 36
ADLS_ACUITY_SCORE: 36
ADLS_ACUITY_SCORE: 35
ADLS_ACUITY_SCORE: 31
ADLS_ACUITY_SCORE: 31
ADLS_ACUITY_SCORE: 36
ADLS_ACUITY_SCORE: 32
ADLS_ACUITY_SCORE: 31
ADLS_ACUITY_SCORE: 32
ADLS_ACUITY_SCORE: 31
ADLS_ACUITY_SCORE: 31
ADLS_ACUITY_SCORE: 36
ADLS_ACUITY_SCORE: 31
ADLS_ACUITY_SCORE: 32
WALKING_OR_CLIMBING_STAIRS: AMBULATION DIFFICULTY, REQUIRES EQUIPMENT
TRANSFERRING: 0-->INDEPENDENT
ADLS_ACUITY_SCORE: 31
ADLS_ACUITY_SCORE: 35
ADLS_ACUITY_SCORE: 31
WEAR_GLASSES_OR_BLIND: YES
ADLS_ACUITY_SCORE: 35
ADLS_ACUITY_SCORE: 35
DIFFICULTY_EATING/SWALLOWING: NO
DOING_ERRANDS_INDEPENDENTLY_DIFFICULTY: YES
CHANGE_IN_FUNCTIONAL_STATUS_SINCE_ONSET_OF_CURRENT_ILLNESS/INJURY: NO
ADLS_ACUITY_SCORE: 36
ADLS_ACUITY_SCORE: 32
ADLS_ACUITY_SCORE: 35
ADLS_ACUITY_SCORE: 31
TRANSFERRING: 0-->INDEPENDENT
ADLS_ACUITY_SCORE: 34
ADLS_ACUITY_SCORE: 36
TOILETING_ISSUES: NO
ADLS_ACUITY_SCORE: 31
ADLS_ACUITY_SCORE: 35
CONCENTRATING,_REMEMBERING_OR_MAKING_DECISIONS_DIFFICULTY: NO
ADLS_ACUITY_SCORE: 31
ADLS_ACUITY_SCORE: 32
ADLS_ACUITY_SCORE: 36
ADLS_ACUITY_SCORE: 35
ADLS_ACUITY_SCORE: 31
ADLS_ACUITY_SCORE: 31
ADLS_ACUITY_SCORE: 35
ADLS_ACUITY_SCORE: 36
ADLS_ACUITY_SCORE: 35
ADLS_ACUITY_SCORE: 31
ADLS_ACUITY_SCORE: 35

## 2022-01-01 ASSESSMENT — ENCOUNTER SYMPTOMS: ARTHRALGIAS: 1

## 2022-03-05 ENCOUNTER — HEALTH MAINTENANCE LETTER (OUTPATIENT)
Age: 87
End: 2022-03-05

## 2022-07-07 PROBLEM — S52.502B: Status: ACTIVE | Noted: 2022-01-01

## 2022-07-07 PROBLEM — S52.602B: Status: ACTIVE | Noted: 2022-01-01

## 2022-07-07 NOTE — ED TRIAGE NOTES
Pt presents via EMS from Independent [facility] Facility, [facility] in [place].    Pt was standing up and tripped over chair, braced self with left hand. Notable left wrist fracture with several bruises. Per EMS, hematoma is worsening during transport.   Denies blood thinner use.    Pt is on a laxative for constipation.    Pt also c/o left knee pain. Able to stand up to commode with minimal difficulty.    ABCs, CMSs intact A&OX4.     Triage Assessment     Row Name 07/07/22 0664       Triage Assessment (Adult)    Airway WDL WDL       Respiratory WDL    Respiratory WDL WDL       Skin Circulation/Temperature WDL    Skin Circulation/Temperature WDL X  left wrist fracture

## 2022-07-07 NOTE — ED NOTES
Bed: ED23  Expected date:   Expected time:   Means of arrival:   Comments:  [id number]  [id number]  Broken wrist

## 2022-07-07 NOTE — ED PROVIDER NOTES
History   Chief Complaint:  Wrist Pain     The history is provided by the patient and medical records.      Selam Sifuentes is a 89 year old female with history of osteoporosis, hearing loss, breast cancer, type 2 diabetes, hypertension, hyperlipidemia, asthma, cognitive impairment, hyperactivity of the bladder, chronic systolic CHF, CKD, carpal tunnel syndrome, PE, left bundle branch block, atrial septal wall aneurysm, cardiomyopathy, vitamin D deficiency, shingles, anxiety, and depression who presents with wrist pain.     EMS report the patient is here from [facility], an independent living facility in [place]. The patient was reportedly standing when she tripped over a chair and braced herself with her left hand. She endorses left wrist and kneecap pain. She denies hitting her head or neck. She reports no other issues and states she's been drinking and eating okay. RN reports no blood thinner use. She denies left elbow pain.     Per chart review, her last tetanus vaccine was 10/20/2021.    Review of Systems   Musculoskeletal: Positive for arthralgias (L wrist, knee).   All other systems reviewed and are negative.      Allergies:  Codeine  Erythromycin  Sulfa Drugs     Fluconazole  Levofloxacin  Morphine And Related      Niacin    Tetracycline    Medications:  Albuterol inhaler  Fosamax  Cholestyramine  Cipro  Clonazepam  Flexeril  Digitek  Furosemide  Lexapro  Lisinopril  Toprol XL  Simvastatin  Tricor  Diovan  Aspirin  Colace  Lipitor  Norvasc  Zoloft  Coreg  Remeron  Cozaar    Past Medical History:     Osteoporosis  Bilateral asymmetric sensorineural hearing loss  Depression   Breast cancer  Type 2 diabetes   Hypertension   Hyperlipidemia   Cognitive impairment  Asthma  Hyperactivity of the bladder  Chronic systolic CHF  CKD  Carpal tunnel syndrome  PE  Left bundle branch block   Atrial septal wall aneurysm  Cardiomyopathy  Vitamin D deficiency  Herpes Zoster (Shingles)  Anxiety    Past  Surgical History:    Appendectomy  Left breast lumpectomy  Right mastectomy  Partial left mastectomy  Tonsillectomy & adenoidectomy  Bilateral IOL  [place] teeth extraction    Family History:    Mother: depression  Father: prostate cancer  Sister: hypertension   Brother: type 2 diabetes     Social History:  The patient presents to the ED alone  Arrives via EMS  PCP: Renetta Wynn, DO   Independent Living Facility, [facility] in [place]    Physical Exam     Patient Vitals for the past 24 hrs:   BP Temp Temp src Pulse Resp SpO2   07/07/22 1815 (!) 191/89 -- -- 75 -- 96 %   07/07/22 1651 -- 98.9  F (37.2  C) Oral -- 18 --   07/07/22 1650 (!) 181/94 -- -- 84 -- 98 %     Physical Exam    Gen: alert, no signs of head trauma  HEENT: PERRL, oropharynx clear  Neck: normal ROM  CV: RRR  CHest- no chest wall tenderness  Pulm: breath sounds equal, lungs clear  Abd: Soft, nontender  Back: no evidence of injury  MSK: contusion to anterior left knee, full flexion and extension no gross effusion, left wrist with deformity and 1.5 cm open wound to ulnar aspect of, 2+ radial pulse LUE, fingers warm and well perfused,   Skin: wrist laceration, contusion to forearm  Neuro: alert, appropriate conversation and interaction, normal function in thumb opposition, finger abduction and wiggles all fingers, normal sensation to touch in all 5 fingers left hand            Emergency Department Course   ECG  ECG taken at 1909, ECG read at 1911  Sinus rhythm with sinus arrhythmia with occasional premature ventricular complexes  Possible Left atrial enlargement  Left axis deviation  Left bundle branch block   No significant change as compared to prior, dated 8/1/2011.  Rate 75 bpm. NM interval 174 ms. QRS duration 144 ms. QT/QTc 448/500 ms. P-R-T axes 62 -41 99.     Imaging:  XR Wrist Left 2 Views   Final Result   IMPRESSION: Minimal interval improvement in the angulation of the fractures of the distal radius and ulna but there is still  radial deviation and angulation of both distal fracture fragments. No new fractures are visualized.      XR Wrist Left 2 Views   Final Result   IMPRESSION: Comminuted fracture of the distal radius with radial angulation of the distal fracture fragment. Additional fracture of the distal ulna both across the base of the ulnar styloid and again approximately 1.5 cm proximal to the wrist joint.    There is radial angulation/deviation of these distal fracture fragments as well. No carpal fractures are seen. There is some calcification along the dorsal aspect of the wrist, but this appears more likely chronic. Soft tissue swelling about the wrist.    Normal carpal alignment. Advanced degenerative change at the STT joint.      XR Knee Left 3 Views   Final Result   IMPRESSION: The left knee is negative for fracture or compartmental narrowing. No effusion.        Report per radiology    Laboratory:  Labs Ordered and Resulted from Time of ED Arrival to Time of ED Departure   BASIC METABOLIC PANEL - Abnormal       Result Value    Sodium 138      Potassium 4.6      Chloride 103      Carbon Dioxide (CO2) 31      Anion Gap 4      Urea Nitrogen 22      Creatinine 0.64      Calcium 9.3      Glucose 136 (*)     GFR Estimate 81     CBC WITH PLATELETS AND DIFFERENTIAL - Abnormal    WBC Count 10.7      RBC Count 4.82      Hemoglobin 13.9      Hematocrit 44.9      MCV 93      MCH 28.8      MCHC 31.0 (*)     RDW 13.1      Platelet Count 227      % Neutrophils 53      % Lymphocytes 15      % Monocytes 9      % Eosinophils 22      % Basophils 1      % Immature Granulocytes 0      NRBCs per 100 WBC 0      Absolute Neutrophils 5.7      Absolute Lymphocytes 1.6      Absolute Monocytes 0.9      Absolute Eosinophils 2.3 (*)     Absolute Basophils 0.1      Absolute Immature Granulocytes 0.0      Absolute NRBCs 0.0     COVID-19 VIRUS (CORONAVIRUS) BY PCR        Procedures      Fracture Reduction     Procedure: Fracture Reduction     Indication:  Open left distal radius and ulnar Fracture    Location: Left Wrist    Consent: Verbal from Patient  Risks (including but not limited to: neurologic compromise, vascular injury, pain, and inability to reduce fracture), benefits, and alternatives were discussed with patient and consent for procedure was obtained.     Universal Protocol: Universal protocol was followed and time out conducted just prior to starting procedure, confirming patient identity, site/side, procedure, patient position, and availability of correct equipment and implants.     Anesthesia/Sedation: Bupivacaine - 0.5%- hematoma block 7 cc    Procedure Detail: I cleaned the skin at the fracture site and laceration with Betadine.  Hematoma block was placed.  The open wound was irrigated with 1000 cc of normal saline using squeeze bottle with tip.  Following this, bacitracin and Adaptic were applied to the wound.  I manually manipulated and reduced the fracture.    Immobilized with: Custom splint (See separate procedure note)  Post-reduction x-ray showed only minimal improvement however given the need for orthopedics consultation and definitive operative management, I did not feel that repeat ED reduction was indicated at this time.  Post-procedure distal neurovascular function was intact.     Patient Status:  The patient tolerated the procedure well: Yes. There were no complications.    Splint placement  Indication: distal radius/ulnar fracture  I personally placed and molded a custom fit orthoglass sugartong splint.  CMS was intact before and after placement of the splint.  The pt was more comfortable with the splint in place.      Emergency Department Course:       Reviewed:  I reviewed nursing notes, vitals, past medical history and Care Everywhere    Assessments:  1655 I obtained history and examined the patient as noted above.   1740 I rechecked the patient.   1754 I rechecked the patient and explained findings.   1758 Hematoma block.   1816 I  performed a fracture reduction.     Consults:  1745 I spoke with BRAXTON Handley, regarding the patient. He recommended bedside washout and reduction. Will see her in the morning.   1843 I spoke with MADDISON Deras PA-C, of the hospitalist service, who accepted for .    Interventions:  Medications   ceFAZolin (ANCEF) intermittent infusion 2 g in 100 mL dextrose PRE-MIX (2 g Intravenous New Bag 7/7/22 1826)   bupivacaine (MARCAINE) 0.5 % injection (has no administration in time range)     Disposition:  The patient was admitted to the hospital under the care of Dr. Templeton.  Discussed with MAJO River for the hospitalist service    Impression & Plan     Medical Decision Making:  Patient presents for mechanical fall.  Full trauma exam completed.  No signs of significant injury to the head neck chest or abdomen.  Rather, injuries are identified to the left forearm and knee.  X-ray of the forearm shows comminuted distal radius and ulnar fractures.  The fracture is open.  Type II open fracture based on my exam.  The injury was discussed with Dr. Ramachandran of orthopedics.  He recommended bedside washout temporizing fracture reduction and splint placement.  This was completed.  For reduction, the fracture is quite unstable.  Post reduction imaging shows only minimal improvement in fracture alignment however at this time, given the need for definitive management by orthopedics watch I did not feel that repeat ED reduction was indicated.    X-ray of the left knee showed no evidence of fracture.  Based on exam suspect contusion.    Patient will require admission for IV antibiotics, orthopedics consult and likely operative intervention for her distal radius fracture.  This was discussed in detail with the patient and her granddaughter.    Diagnosis:    ICD-10-CM    1. Type I or II open fracture of distal end of left radius, unspecified fracture morphology, initial encounter  S52.502B    2. Type I or II open  fracture of distal end of left ulna, unspecified fracture morphology, initial encounter  S52.602B      Scribe Disclosure:  I, Margarita Zaid, am serving as a scribe at 4:55 PM on 7/7/2022 to document services personally performed by Yoselyn Ruiz MD based on my observations and the provider's statements to me.            Yoselyn Ruiz MD  07/07/22 2012

## 2022-07-08 NOTE — ED NOTES
[facility]  ED Nurse Handoff Report    Selam Sifuentes is a 89 year old female   ED Chief complaint: Wrist Pain  . ED Diagnosis:   Final diagnoses:   Type I or II open fracture of distal end of left radius, unspecified fracture morphology, initial encounter   Type I or II open fracture of distal end of left ulna, unspecified fracture morphology, initial encounter     Allergies:   Allergies   Allergen Reactions     Codeine      Erythromycin      Sulfa Drugs        Code Status: Full Code  Activity level - Baseline/Home:  Independent. Activity Level - Current:   Assist X 2. Lift room needed: No. Bariatric: No   Needed: No   Isolation: No. Infection: Not Applicable  COVID r/o and special precautions.     Vital Signs:   Vitals:    07/07/22 1650 07/07/22 1651 07/07/22 1815   BP: (!) 181/94  (!) 191/89   Pulse: 84  75   Resp:  18    Temp:  98.9  F (37.2  C)    TempSrc:  Oral    SpO2: 98%  96%       Cardiac Rhythm:  ,      Pain level:    Patient confused: No. Patient Falls Risk: Yes.   Elimination Status: Has voided   Patient Report - Initial Complaint: Left wrist and left knee pain. Focused Assessment: Selam Sifuentes is a 89 year old female with history of osteoporosis, hearing loss, breast cancer, type 2 diabetes, hypertension, hyperlipidemia, asthma, cognitive impairment, hyperactivity of the bladder, chronic systolic CHF, CKD, carpal tunnel syndrome, PE, left bundle branch block, atrial septal wall aneurysm, cardiomyopathy, vitamin D deficiency, shingles, anxiety, and depression who presents with wrist pain.    Pt. from [facility] living [facility] in [place]. The patient ttripped over a chair and braced herself with her left hand at the living facility. She states left wrist and left knee pain. Granddaughter at bedside.  Tests Performed: labs, X-ray. Abnormal Results:   Labs Ordered and Resulted from Time of ED Arrival to Time of ED Departure   BASIC METABOLIC  PANEL - Abnormal       Result Value    Sodium 138      Potassium 4.6      Chloride 103      Carbon Dioxide (CO2) 31      Anion Gap 4      Urea Nitrogen 22      Creatinine 0.64      Calcium 9.3      Glucose 136 (*)     GFR Estimate 81     CBC WITH PLATELETS AND DIFFERENTIAL - Abnormal    WBC Count 10.7      RBC Count 4.82      Hemoglobin 13.9      Hematocrit 44.9      MCV 93      MCH 28.8      MCHC 31.0 (*)     RDW 13.1      Platelet Count 227      % Neutrophils 53      % Lymphocytes 15      % Monocytes 9      % Eosinophils 22      % Basophils 1      % Immature Granulocytes 0      NRBCs per 100 WBC 0      Absolute Neutrophils 5.7      Absolute Lymphocytes 1.6      Absolute Monocytes 0.9      Absolute Eosinophils 2.3 (*)     Absolute Basophils 0.1      Absolute Immature Granulocytes 0.0      Absolute NRBCs 0.0     COVID-19 VIRUS (CORONAVIRUS) BY PCR - Normal    SARS CoV2 PCR Negative       XR Wrist Left 2 Views   Final Result   IMPRESSION: Comminuted fracture of the distal radius with radial angulation of the distal fracture fragment. Additional fracture of the distal ulna both across the base of the ulnar styloid and again approximately 1.5 cm proximal to the wrist joint.    There is radial angulation/deviation of these distal fracture fragments as well. No carpal fractures are seen. There is some calcification along the dorsal aspect of the wrist, but this appears more likely chronic. Soft tissue swelling about the wrist.    Normal carpal alignment. Advanced degenerative change at the STT joint.      XR Knee Left 3 Views   Final Result   IMPRESSION: The left knee is negative for fracture or compartmental narrowing. No effusion.      XR Wrist Left 2 Views    (Results Pending)       Treatments provided: IV abx  Family Comments: granddaughter at bedside  OBS brochure/video discussed/provided to patient:  No  ED Medications:   Medications   bupivacaine (MARCAINE) 0.5 % injection (has no administration in time range)    acetaminophen (TYLENOL) tablet 650 mg (has no administration in time range)   ceFAZolin (ANCEF) intermittent infusion 2 g in 100 mL dextrose PRE-MIX (0 g Intravenous Stopped 7/7/22 1919)     Drips infusing:  No  For the majority of the shift, the patient's behavior Green. Interventions performed were N/A.    Sepsis treatment initiated: No     Patient tested for COVID 19 prior to admission: YES    ED Nurse Name/Phone Number: Belinda Del Valle RN, RECEIVING UNIT ED HANDOFF REVIEW    Above ED Nurse Handoff Report was reviewed: Yes  Reviewed by: Akilah Jamil RN on July 7, 2022 at 8:12 PM     7:19 PM

## 2022-07-08 NOTE — ANESTHESIA PREPROCEDURE EVALUATION
Anesthesia Pre-Procedure Evaluation    Patient: Selam Sifuentes   MRN: [id number] : 10/28/1927        Procedure : Procedure(s):  OPEN REDUCTION INTERNAL FIXATION, FRACTURE, WRIST, WITH I & D OF OPEN FRACTURE with synthes plates and screws          No past medical history on file.   Past Surgical History:   Procedure Laterality Date     APPENDECTOMY       BREAST SURGERY Left 2016    Lumpectomy     MASTECTOMY Right      PA MASTECTOMY, PARTIAL Left 2016    Procedure: LEFT LUMPECTOMY AFTER WIRE LOCALIZATION @0730;  Surgeon: Deb Varghese MD;  [place] [facility] [place]  Service: General     TONSILLECTOMY        Allergies   Allergen Reactions     Codeine      Erythromycin      Sulfa Drugs       Social History     Tobacco Use     Smoking status: Never Smoker     Smokeless tobacco: Never Used   Substance Use Topics     Alcohol use: Yes     Comment: Alcoholic Drinks/day: Rarely      Wt Readings from Last 1 Encounters:   22 57.5 kg (126 lb 12.8 oz)        Anesthesia Evaluation            ROS/MED HX  ENT/Pulmonary:     (+) Intermittent, asthma     Neurologic:  - neg neurologic ROS     Cardiovascular:     (+) hypertension-----CHF Last EF: 40 fainting (syncope).     METS/Exercise Tolerance:     Hematologic:       Musculoskeletal:   (+) arthritis, fracture, Fracture location: LUE,     GI/Hepatic:  - neg GI/hepatic ROS     Renal/Genitourinary:     (+) renal disease, type: CRI, Pt does not require dialysis,     Endo:     (+) type II DM,     Psychiatric/Substance Use:  - neg psychiatric ROS     Infectious Disease:  - neg infectious disease ROS     Malignancy:       Other:            Physical Exam    Airway        Mallampati: II   TM distance: > 3 FB   Neck ROM: full   Mouth opening: > 3 cm    Respiratory Devices and Support         Dental  no notable dental history         Cardiovascular   cardiovascular exam normal          Pulmonary   pulmonary exam normal                OUTSIDE LABS:  CBC:   Lab  Results   Component Value Date    WBC 9.3 07/08/2022    WBC 10.7 07/07/2022    HGB 12.0 07/08/2022    HGB 13.9 07/07/2022    HCT 38.7 07/08/2022    HCT 44.9 07/07/2022     07/08/2022     07/07/2022     BMP:   Lab Results   Component Value Date     07/08/2022     07/07/2022    POTASSIUM 3.9 07/08/2022    POTASSIUM 4.6 07/07/2022    CHLORIDE 107 07/08/2022    CHLORIDE 103 07/07/2022    CO2 29 07/08/2022    CO2 31 07/07/2022    BUN 16 07/08/2022    BUN 22 07/07/2022    CR 0.64 07/08/2022    CR 0.64 07/07/2022    GLC 86 07/08/2022     (H) 07/07/2022     COAGS: No results found for: PTT, INR, FIBR  POC: No results found for: BGM, HCG, HCGS  HEPATIC: No results found for: ALBUMIN, PROTTOTAL, ALT, AST, GGT, ALKPHOS, BILITOTAL, BILIDIRECT, ANAND  OTHER:   Lab Results   Component Value Date    A1C 6.8 (H) 02/16/2017    JOHN 8.6 07/08/2022    MAG 2.1 07/08/2022       Anesthesia Plan    ASA Status:  3   NPO Status:  NPO Appropriate    Anesthesia Type: Peripheral Nerve Block.   Induction: Intravenous.   Maintenance: TIVA.   Techniques and Equipment:       - Drips/Meds: Dexmed. infusion     Consents    Anesthesia Plan(s) and associated risks, benefits, and realistic alternatives discussed. Questions answered and patient/representative(s) expressed understanding.    - Discussed:     - Discussed with:  Patient      - Extended Intubation/Ventilatory Support Discussed: No.      - Patient is DNR/DNI Status: Yes             Suspend during perioperative period? No.    Use of blood products discussed: No .     Postoperative Care    Pain management: IV analgesics, Peripheral nerve block (Single Shot).   PONV prophylaxis: Ondansetron (or other 5HT-3), Dexamethasone or Solumedrol     Comments:    Other Comments: The surgeon has given a verbal order transferring care of this patient to me for the performance of a regional analgesia block for post-op pain control. It is requested of me because I am uniquely  trained and qualified to perform this block and the surgeon is neither trained nor qualified to perform this procedure.    Ultrasound guided peripheral nerve block(s) discussed. The patient was informed that undergoing the block is not required for surgery to proceed and is optional, but they can be beneficial in reducing post operative pain medication requirements for a period of time (several hours to a few days). Block rationale, procedure, expected results, and block specific side effects reviewed with the patient. Risks, including but not limited to bleeding, infection, nerve damage/damage to surrounding structures, medication adverse/allergic reactions, and block failure discussed.  Questions encouraged and answered.  The patient wishes to proceed.               Jeremy Deras MD

## 2022-07-08 NOTE — ANESTHESIA PROCEDURE NOTES
Brachial plexus Procedure Note    Pre-Procedure   Staff -        Anesthesiologist:  Jeremy Deras MD       Performed By: anesthesiologist       Referred By: brian       Location: pre-op       Pre-Anesthestic Checklist: patient identified, IV checked, site marked, risks and benefits discussed, informed consent, monitors and equipment checked, pre-op evaluation and at physician/surgeon's request  Timeout:       Correct Patient: Yes        Correct Procedure: Yes        Correct Site: Yes        Correct Position: Yes        Correct Laterality: Yes        Site Marked: Yes  Procedure Documentation  Procedure: Brachial plexus       Diagnosis: FRACTURE       Laterality: left       Patient Position: supine       Patient Prep/Sterile Barriers: sterile gloves, mask       Skin prep: Chloraprep       Local skin infiltrated with 3 mL of 2% lidocaine.  (supraclavicular approach).       Needle Type: insulated and short bevel       Needle Gauge: 21.        Needle Length (Inches): 4        Ultrasound guided       1. Ultrasound was used to identify targeted nerve, plexus, vascular marker, or fascial plane and place a needle adjacent to it in real-time.       2. Ultrasound was used to visualize the spread of anesthetic in close proximity to the above referenced structure.       4. The visualized anatomic structures appeared normal.       5. There were no apparent abnormal pathologic findings.    Assessment/Narrative         The placement was negative for: blood aspirated, painful injection and site bleeding       Paresthesias: No.       Bolus given via needle. no blood aspirated via catheter.        Secured via.        Insertion/Infusion Method: Single Shot       Complications: none       Injection made incrementally with aspirations every 5 mL.     Comments:  Good LA spread around BP including corner pocket

## 2022-07-08 NOTE — CONSULTS
[facility]    Orthopedics Consultation    Date of Admission:  7/7/2022    Assessment & Plan   Selam Sifuentes is a 89 year old female who was admitted on 7/7/2022 with open comminuted left distal radius and ulna fracture.    Discussed both operative and nonoperative management.  Risks of surgery discussed included but not limited to bleeding, infection, damage to surrounding neurovascular structures, stiffness, malunion, delayed union, nonunion, need for revision surgery, blood clots, pulmonary embolus, stroke, anesthetic complications and even death.  No guarantees given or implied. Patient and granddaughter thoughtfully acknowledges risks and wishes to proceed.      Delvin Johnson MD, MD    Code Status    No CPR- Do NOT Intubate    Reason for Consult   Reason for consult: I was asked by Dr. Weir to evaluate this patient for left wrist fracture.    Primary Care Physician   Renetta Wynn    History of Present Illness   Selam Sifuentes is a 89 year old female with left open distal radius and ulnar fracture.  Occurred while playing bingo yesterday when her foot must of hit a chair and she tripped on the floor bracing with her left arm.  PMH includes: asthma, hypertension, type 2 diabetes, hyperlipidemia, CKD stage III and chronic systolic congestive heart failure.  There was obvious deformity of the left arm with bone protrusion.  Denies cp or sob.  She does not drink alcohol or smoke cigarettes.      MEDS:   No current outpatient medications on file.       PAST MEDICAL HISTORY: No past medical history on file.    PAST SURGICAL HISTORY:   Past Surgical History:   Procedure Laterality Date     APPENDECTOMY       BREAST SURGERY Left 08/01/2016    Lumpectomy     MASTECTOMY Right      TN MASTECTOMY, PARTIAL Left 8/1/2016    Procedure: LEFT LUMPECTOMY AFTER WIRE LOCALIZATION @0730;  Surgeon: Deb Varghese MD;  Location: [facility];  Service: General     TONSILLECTOMY          FAMILY HISTORY:   Family History   Problem Relation Age of Onset     Depression Mother      Prostate Cancer Father      Hypertension Sister      Breast Cancer No family hx of        SOCIAL HISTORY:   Social History     Tobacco Use     Smoking status: Never Smoker     Smokeless tobacco: Never Used   Substance Use Topics     Alcohol use: Yes     Comment: Alcoholic Drinks/day: Rarely       ALLERGIES:    Allergies   Allergen Reactions     Codeine      Erythromycin      Sulfa Drugs        ROS:  10 point ROS neg other than the symptoms noted above in the HPI.      Physical Exam   Temp: 97.1  F (36.2  C) Temp src: Temporal BP: (!) 168/85 Pulse: 73   Resp: 18 SpO2: 95 % O2 Device: None (Room air)    Vital Signs with Ranges  Temp:  [97.1  F (36.2  C)-98.9  F (37.2  C)] 97.1  F (36.2  C)  Pulse:  [65-84] 73  Resp:  [16-18] 18  BP: (155-191)/() 168/85  SpO2:  [91 %-98 %] 95 %  126 lbs 12.8 oz    Constitutional: Pleasant, alert, appropriate, following commands.  HEENT: Head atraumatic normocephalic. Pupils equal round and reactive to light.  Respiratory: Unlabored breathing no audible wheeze  Cardiovascular: Regular rate and rhythm  GI: Abdomen soft nontender nondistended.  Lymph/Hematologic: No lymphadenopathy in areas examined  Genitourinary:  No lim  Skin: No rashes, no cyanosis, no edema.  Musculoskeletal: LUE splinted, soaked through with blood, wiggles tips of fingers, < sec cap refill. LLE fell, bruising, able to straight leg raise.  LLE NVI.  Neurologic: normal without focal findings, mental status, speech normal, alert and oriented x iii, CHRISTIANNE, reflexes normal and symmetric        Data   Results for orders placed or performed during the hospital encounter of 07/07/22 (from the past 24 hour(s))   XR Knee Left 3 Views    Narrative    EXAM: XR KNEE LEFT 3 VIEWS  LOCATION: [facility]  DATE/TIME: 7/7/2022 5:09 PM    INDICATION: Trauma, pain.  COMPARISON: None.      Impression     IMPRESSION: The left knee is negative for fracture or compartmental narrowing. No effusion.   XR Wrist Left 2 Views    Narrative    EXAM: XR WRIST LEFT 2 VIEWS  LOCATION: [facility]  DATE/TIME: 7/7/2022 5:09 PM    INDICATION: Trauma, pain.    COMPARISON: None.      Impression    IMPRESSION: Comminuted fracture of the distal radius with radial angulation of the distal fracture fragment. Additional fracture of the distal ulna both across the base of the ulnar styloid and again approximately 1.5 cm proximal to the wrist joint.   There is radial angulation/deviation of these distal fracture fragments as well. No carpal fractures are seen. There is some calcification along the dorsal aspect of the wrist, but this appears more likely chronic. Soft tissue swelling about the wrist.   Normal carpal alignment. Advanced degenerative change at the STT joint.   CBC + differential    Narrative    The following orders were created for panel order CBC + differential.  Procedure                               Abnormality         Status                     ---------                               -----------         ------                     CBC with platelets and d...[[id number]]  Abnormal            Final result                 Please view results for these tests on the individual orders.   Basic metabolic panel (BMP)   Result Value Ref Range    Sodium 138 133 - 144 mmol/L    Potassium 4.6 3.4 - 5.3 mmol/L    Chloride 103 94 - 109 mmol/L    Carbon Dioxide (CO2) 31 20 - 32 mmol/L    Anion Gap 4 3 - 14 mmol/L    Urea Nitrogen 22 7 - 30 mg/dL    Creatinine 0.64 0.52 - 1.04 mg/dL    Calcium 9.3 8.5 - 10.1 mg/dL    Glucose 136 (H) 70 - 99 mg/dL    GFR Estimate 81 >60 mL/min/1.73m2   CBC with platelets and differential   Result Value Ref Range    WBC Count 10.7 4.0 - 11.0 10e3/uL    RBC Count 4.82 3.80 - 5.20 10e6/uL    Hemoglobin 13.9 11.7 - 15.7 g/dL    Hematocrit 44.9 35.0 - 47.0 %    MCV 93 78 - 100 fL    MCH  28.8 26.5 - 33.0 pg    MCHC 31.0 (L) 31.5 - 36.5 g/dL    RDW 13.1 10.0 - 15.0 %    Platelet Count 227 150 - 450 10e3/uL    % Neutrophils 53 %    % Lymphocytes 15 %    % Monocytes 9 %    % Eosinophils 22 %    % Basophils 1 %    % Immature Granulocytes 0 %    NRBCs per 100 WBC 0 <1 /100    Absolute Neutrophils 5.7 1.6 - 8.3 10e3/uL    Absolute Lymphocytes 1.6 0.8 - 5.3 10e3/uL    Absolute Monocytes 0.9 0.0 - 1.3 10e3/uL    Absolute Eosinophils 2.3 (H) 0.0 - 0.7 10e3/uL    Absolute Basophils 0.1 0.0 - 0.2 10e3/uL    Absolute Immature Granulocytes 0.0 <=0.4 10e3/uL    Absolute NRBCs 0.0 10e3/uL   Magnesium   Result Value Ref Range    Magnesium 2.1 1.6 - 2.3 mg/dL   Calcium   Result Value Ref Range    Calcium 9.2 8.5 - 10.1 mg/dL   Asymptomatic COVID-19 Virus (Coronavirus) by PCR Nasopharyngeal    Specimen: Nasopharyngeal; Swab   Result Value Ref Range    SARS CoV2 PCR Negative Negative    Narrative    Testing was performed using the Xpert Xpress SARS-CoV-2 Assay on the   Cepheid Gene-Xpert Instrument Systems. Additional information about   this Emergency Use Authorization (EUA) assay can be found via the Lab   Guide. This test should be ordered for the detection of SARS-CoV-2 in   individuals who meet SARS-CoV-2 clinical and/or epidemiological   criteria. Test performance is unknown in asymptomatic patients. This   test is for in vitro diagnostic use under the FDA EUA for   laboratories certified under CLIA to perform high complexity testing.   This test has not been FDA cleared or approved. A negative result   does not rule out the presence of PCR inhibitors in the specimen or   target RNA in concentration below the limit of detection for the   assay. The possibility of a false negative should be considered if   the patient's recent exposure or clinical presentation suggests   COVID-19. This test was validated by the [facility]. This laboratory is certified under the Clinical  Laboratory Improvement Amendments of 1988 (CLIA-88) as qualified to perform high complexity laboratory testing.     EKG 12 lead   Result Value Ref Range    Systolic Blood Pressure  mmHg    Diastolic Blood Pressure  mmHg    Ventricular Rate 75 BPM    Atrial Rate 75 BPM    IL Interval 174 ms    QRS Duration 144 ms     ms    QTc 500 ms    P Axis 62 degrees    R AXIS -41 degrees    T Axis 99 degrees    Interpretation ECG       Sinus rhythm with sinus arrhythmia with occasional Premature ventricular complexes  Possible Left atrial enlargement  Left axis deviation  Left bundle branch block  Abnormal ECG  When compared with ECG of 13-DEC-2016 19:58,  Premature ventricular complexes are now Present  QT has lengthened     XR Wrist Left 2 Views    Narrative    EXAM: XR WRIST LEFT 2 VIEW  LOCATION: [facility]  DATE/TIME: 7/7/2022 7:43 PM    INDICATION: Post reduction.  COMPARISON: 7/7/2022.      Impression    IMPRESSION: Minimal interval improvement in the angulation of the fractures of the distal radius and ulna but there is still radial deviation and angulation of both distal fracture fragments. No new fractures are visualized.   Basic metabolic panel   Result Value Ref Range    Sodium 141 133 - 144 mmol/L    Potassium 3.9 3.4 - 5.3 mmol/L    Chloride 107 94 - 109 mmol/L    Carbon Dioxide (CO2) 29 20 - 32 mmol/L    Anion Gap 5 3 - 14 mmol/L    Urea Nitrogen 16 7 - 30 mg/dL    Creatinine 0.64 0.52 - 1.04 mg/dL    Calcium 8.6 8.5 - 10.1 mg/dL    Glucose 86 70 - 99 mg/dL    GFR Estimate 81 >60 mL/min/1.73m2   CBC with platelets   Result Value Ref Range    WBC Count 9.3 4.0 - 11.0 10e3/uL    RBC Count 4.19 3.80 - 5.20 10e6/uL    Hemoglobin 12.0 11.7 - 15.7 g/dL    Hematocrit 38.7 35.0 - 47.0 %    MCV 92 78 - 100 fL    MCH 28.6 26.5 - 33.0 pg    MCHC 31.0 (L) 31.5 - 36.5 g/dL    RDW 13.1 10.0 - 15.0 %    Platelet Count 192 150 - 450 10e3/uL   Magnesium   Result Value Ref Range    Magnesium 2.1 1.6 -  2.3 mg/dL

## 2022-07-08 NOTE — BRIEF OP NOTE
[facility]    Brief Operative Note    Pre-operative diagnosis: Left open distal radius and ulna fx  Post-operative diagnosis Same as pre-operative diagnosis    Procedure: Procedure(s):  OPEN REDUCTION INTERNAL FIXATION, FRACTURE, WRIST, WITH I & D OF OPEN FRACTURE with synthes plates and screws  Surgeon: Surgeon(s) and Role:     * Delvin Johnson MD - Primary     * Júnior Fishman PA-C - Assisting  Anesthesia: General   Estimated Blood Loss: Less than 100 ml    Drains: None  Specimens: * No specimens in log *  Findings:   None.  Complications: None.  Implants:   Implant Name Type Inv. Item Serial No.  Lot No. LRB No. Used Action   Wrist spanning plate Metallic Hardware/[place]   SYNTHES [id number] 2022 Left 1 Implanted   IMP SCR SYN LCR DIST 2.4X12MM SELF TAP .812 - [id number] Metallic Hardware/[place] IMP SCR SYN LCR DIST 2.4X12MM SELF TAP .812  SYNTHES-STRATEC [id number] [id number] 2022 Left 3 Implanted   IMP SCR SYN LCP DIST 2.4X14MM SELF TAP .814 - [id number] Metallic Hardware/[place] IMP SCR SYN LCP DIST 2.4X14MM SELF TAP .814  SYNTHES-STRATEC 8005 [id number] 2022 Left 3 Implanted   IMP SCR SYN LCP DIST 2.4X10MM SELF TAP .810 - [id number] Metallic Hardware/[place] IMP SCR SYN LCP DIST 2.4X10MM SELF TAP .810  SYNTHES-STRATEC 8005 [id number] 2022 Left 1 Implanted   IMP SCR SYN LCP DIST 2.4X16MM SELF TAP .816 - [id number] Metallic Hardware/[place] IMP SCR SYN LCP DIST 2.4X16MM SELF TAP .816  SYNTHES-STRATEC 8005 [id number] 2022 Left 3 Implanted   IMP SCR SYN LCP CORTEX 2.4X14MM SELF .764 - [id number] Metallic Hardware/[place] IMP SCR SYN LCP CORTEX 2.4X14MM SELF .764  SYNTHES-STRATEC 8005 [id number] 2022 Left 2 Implanted   IMP SCR SYN CAN T8 STARDRIVE 2.4X14MM VA-LCP ST 02.210.114 - [id number] Metallic Hardware/[place] IMP SCR SYN CAN T8 STARDRIVE 2.4X14MM VA-LCP ST 02.210.114  [facility] 8006 [id number] 2022 Left 1 Implanted   IMP SCR SYN CAN T8  STARDRIVE 2.4X16MM VA-LCP ST 02.210.116 - [id number] Metallic Hardware/[place] IMP SCR SYN CAN T8 STARDRIVE 2.4X16MM VA-LCP ST 02.210.116  [facility] 25MAY 2022 Left 1 Implanted   IMP PLATE SYN LCP CONDYLAR 2.4MM 249.679 - [id number] Metallic Hardware/[place] IMP PLATE SYN LCP CONDYLAR 2.4MM 249.679  [facility] 8[facility] [id number] Left 1 Implanted

## 2022-07-08 NOTE — PROGRESS NOTES
[facility]    Medicine Progress Note - Hospitalist Service    Date of Admission:  7/7/2022    Assessment & Plan          Selam Sifuentes is a 89 year old female with a PMH significant for asthma, hypertension, type 2 diabetes, hyperlipidemia, CKD stage III and chronic systolic congestive heart failure who presents after mechanical fall with obvious fracture of her left arm.    1.  Open left radius and ulna fractures.  -Initially started on IV cefazolin.  -Continue IV cefazolin 1 g every 8 hours for 2 more doses.  -Status postrepair by orthopedic surgery on 7/8.  -Pain medications as needed.  -Use incentive spirometry.  -Work with therapy.    2.  Hypertension.  -Restart carvedilol 12.5 mg twice a day.  -Hold losartan and amlodipine today.  -IV hydralazine if needed.    3.  Left knee pain.    -No acute fracture noted on x-ray.  -Pain medications as needed.    4.  Hyperlipidemia.  -Restart atorvastatin 10 mg a day.    5.  Depression.  -Restart sertraline 100 mg a day.  -Restart mirtazapine 15 mg at bedtime.    I did update granddaughter at bedside on 7/8.       Diet: Advance Diet as Tolerated: Regular Diet Adult    DVT Prophylaxis: Pneumatic Compression Devices, aspirin.  Mon Catheter: Not present  Central Lines: None  Cardiac Monitoring: None  Code Status: No CPR- Do NOT Intubate      Disposition Plan      Expected Discharge Date: 07/09/2022                    Louie Melendez DO  Hospitalist Service  [facility]  Securely message with the Vocera Web Console (learn more here)  Text page via [employer] Paging/Directory         Clinically Significant Risk Factors Present on Admission                 # Hypertension: home medication list includes antihypertensive(s)          ______________________________________________________________________    Interval History   Having left arm and knee pain.  Denies chest pain, shortness of breath, fevers, chills, nausea, or  vomiting.    Data reviewed today: I reviewed all medications, new labs and imaging results over the last 24 hours.     Physical Exam   Vital Signs: Temp: 97.9  F (36.6  C) Temp src: Temporal BP: (!) 150/64 Pulse: 73   Resp: 12 SpO2: 92 % O2 Device: None (Room air) Oxygen Delivery: 2 LPM  Weight: 126 lbs 12.8 oz  Gen:  NAD, A&O seemingly x3.  Hard of hearing.  Eyes:  PERRL, sclera anicteric.  OP:  MMM, no lesions.  Neck:  Supple.  CV:  Regular, no loud murmurs.  Lung:  CTA b/l, normal effort.  Ab:  +BS, soft.  Skin:  Warm, dry to touch.  No rash.  Ext:  No pitting edema LE b/l.      Data   Recent Labs   Lab 07/08/22  1154 07/08/22  0733 07/07/22  1808   WBC  --  9.3 10.7   HGB  --  12.0 13.9   MCV  --  92 93   PLT  --  192 227   NA  --  141 138   POTASSIUM  --  3.9 4.6   CHLORIDE  --  107 103   CO2  --  29 31   BUN  --  16 22   CR  --  0.64 0.64   ANIONGAP  --  5 4   JOHN  --  8.6 9.2  9.3   * 86 136*

## 2022-07-08 NOTE — PLAN OF CARE
Goal Outcome Evaluation:    Patient arrived on the unit at approximately 2030 from the ED. Is A&Ox4. Is hard of hearing and has one hearing aid on her right ear.Rated pain at a one. Has a plint on her left hand, scant amount of blood noted on the ace wrap. Is using the pure wick. Will be NPO after midnight. Surgical bath done. Plan is for surgery in the AM.

## 2022-07-08 NOTE — ANESTHESIA CARE TRANSFER NOTE
Patient: Selam Sifuentes    Procedure: Procedure(s):  OPEN REDUCTION INTERNAL FIXATION, FRACTURE, WRIST, WITH I & D OF OPEN FRACTURE with synthes plates and screws       Diagnosis: * No pre-op diagnosis entered *  Diagnosis Additional Information: No value filed.    Anesthesia Type:   Peripheral Nerve Block     Note:      Level of Consciousness: awake  Oxygen Supplementation: room air    Independent Airway: airway patency satisfactory and stable  Dentition: dentition unchanged  Vital Signs Stable: post-procedure vital signs reviewed and stable  Report to RN Given: handoff report given  Patient transferred to: PACU    Handoff Report: Identifed the Patient, Identified the Reponsible Provider, Reviewed the pertinent medical history, Discussed the surgical course, Reviewed Intra-OP anesthesia mangement and issues during anesthesia, Set expectations for post-procedure period and Allowed opportunity for questions and acknowledgement of understanding      Vitals:  Vitals Value Taken Time   /55 07/08/22 1151   Temp     Pulse 56 07/08/22 1154   Resp 83 07/08/22 1154   SpO2 93 % 07/08/22 1154   Vitals shown include unvalidated device data.    Electronically Signed By: YOSHI Rodriguez CRNA  July 8, 2022  11:55 AM

## 2022-07-08 NOTE — ANESTHESIA POSTPROCEDURE EVALUATION
Patient: Selam Sifuentes    Procedure: Procedure(s):  OPEN REDUCTION INTERNAL FIXATION, FRACTURE, WRIST, WITH I & D OF OPEN FRACTURE with synthes plates and screws       Anesthesia Type:  Peripheral Nerve Block    Note:  Disposition: Inpatient   Postop Pain Control: Uneventful            Sign Out: Well controlled pain   PONV: No   Neuro/Psych: Uneventful            Sign Out: Acceptable/Baseline neuro status   Airway/Respiratory: Uneventful            Sign Out: Acceptable/Baseline resp. status   CV/Hemodynamics: Uneventful            Sign Out: Acceptable CV status   Other NRE: NONE   DID A NON-ROUTINE EVENT OCCUR? No           Last vitals:  Vitals Value Taken Time   /62 07/08/22 1245   Temp 98.2  F (36.8  C) 07/08/22 1245   Pulse 56 07/08/22 1246   Resp 33 07/08/22 1248   SpO2 99 % 07/08/22 1249   Vitals shown include unvalidated device data.    Electronically Signed By: Jeremy Deras MD  July 8, 2022  4:40 PM

## 2022-07-08 NOTE — OP NOTE
PREOPERATIVE DIAGNOSES:    1.  Left type 1 open distal third ulna fracture.  2.  Left highly comminuted intra-articular distal radius fracture.  3.  Osteoporosis.    POSTOPERATIVE DIAGNOSES:    1.  Left type 1 open distal third ulna fracture.  2.  Left highly comminuted intra-articular distal radius fracture.  3.  Osteoporosis.    PROCEDURES PERFORMED:    1.  Sharp excisional debridement of skin, subcutaneous tissue, fat, fascia with curettage of bone, left ulna open fracture.  2.  Open reduction and spanning internal fixation of left distal radius.  3.  Closed reduction and percutaneous pinning, left distal radius.  4.  Open reduction and internal fixation of left distal ulna.    IMPLANTS USED:  Synthes Spanning distal radius plate and 2.4 mm mini fragment plate.    INDICATIONS FOR PROCEDURE:  A 89-year-old frail female who lives in a nursing home, who fell with severe comminuted fractures of the left distal radius and ulna with known osteoporosis.  Lives in a nursing home with some dementia.  Risks of her injury and surgery discussed include but not limited to bleeding, infection, damage to surrounding neurovascular structures, malunion, delayed union, nonunion, stiffness, need for additional surgeries, charanjit-implant fracture, blood clots, and anesthetic complications.  She understands and wished to proceed.  Consent signed.    DESCRIPTION OF PROCEDURE:  The patient was identified in the preoperative holding area per hospital policy, correct site marked, to the OR and to the OR table.  Anesthesia induced.  She had been given a block and Precedex per anesthesia.  Betadine prep and drape to the open fracture.  1.  Using a scalpel, I sharply excised skin, subcutaneous tissue, fat, fascia 2 cm x 1 cm x 1 cm with curettage of distal ulna open fracture site.  2.  Provisionally pulled traction and reduced the wrist firing a pin through the distal ulnar styloid into the shaft and then 2 radial styloid pins.  Her bone  quality was very poor in the distal radius fracture was far distal.  At 89 with osteoporosis, it was not felt that the volar locking plate would hold her.  I elected to use a Spanning dorsal plate.  Using fluoroscopy, I marked out the dislocation of incisions and made a dorsal incision on the forearm and one over the third metacarpal.  Dissected carefully down to bone and with a Sanabria created a pathway to pass the plate, which was then passed.  The wrist was aligned and under fluoroscopy brought the plate to bone and then locking screws distal alignment of the fracture, a cortical screw and then locking screws proximal.  This had restored the alignment of the radius.  The ulnar pin head cut out and elected to perform open reduction and internal fixation of the ulna.  Expanded incision that had been used for debridement.  Cleaned the fracture site.  The bone was very osteoporotic and soft.  Optimized the fracture alignment.  The plate had to be made distal in order to get adequate purchase.  Selected a 24 plate contoured and used a cortical screw in the shaft and 2 locking screws for open reduction and internal fixation of the ulna.  Final x-rays showed restoration of the radial alignment, inclination and angulation, and safe position of the implants.    POSTOPERATIVE PLAN:    1.  Nonweightbearing left upper extremity.  2.  Due to her frailty, she was put in a sugar tong.  3.  Elevate.  4.  Minimize narcotics.  5.  At 2 weeks, she will go into a short arm cast.  6.  Between 5 and 6 weeks she will have the cast removed and pin removed.  7.  She will likely remain in the removal of the dorsal Spanning plate once her fracture is healed, likely 6 months from now.    Delvin Johnson MD        D: 2022   T: 2022   MT: DOV    Name:     SUNDAR SEVILLABrooklynn  MRN:      [id number]        Account:        [id number]   :      10/28/1927     Document: [id number]

## 2022-07-08 NOTE — PHARMACY-ADMISSION MEDICATION HISTORY
Admission medication history interview status for this patient is complete. See [facility] admission navigator for allergy information, prior to admission medications and immunization status.     Medication history interview done, indicate source(s): Patient - pt is a very good historian  Medication history resources (including written lists, pill bottles, clinic record): Yoel + Dennis  Pharmacy: [facility] in [place]    Changes made to PTA medication list:  Added: ASA 81, docusate, Centrum MVI, APAP extended-release, calcium-D, atorvastatin, amlodipine, sertraline, Flovent, carvedilol, mirtazapine, losartan  Changed: clarified / changed / completed sig for: albuterol inh, Allegra  Reported as Not Taking: none  Removed: alendronate, cholestyramine, cipro HC drops, clonazepam, cyclobenzaprine, digoxin, Flonase, furosemide, Lexapro, lisinopril, metoprolol XL, simvastatin, Tricor, valsartan    Actions taken by pharmacist (provider contacted, etc):None     Additional medication history information:   - reports using albuterol PRN usually not needing more than 3 x per day  - has Rx for ketoconazole shampoo but declined using, reporting fear of losing hair  - will occasionally use hydrocortisone cream for small areas of rash on face / ear    Medication reconciliation/reorder completed by provider prior to medication history?  Y   (Y/N)     Prior to Admission medications    Medication Sig Last Dose Taking? Auth Provider Long Term End Date   acetaminophen (RA ACETAMINOPHEN) 650 MG CR tablet Take 1,300 mg by mouth every morning 7/7/2022 at am Yes Unknown, Entered By History     acetaminophen (RA ACETAMINOPHEN) 650 MG CR tablet Take 650 mg by mouth every evening 7/6/2022 at pm Yes Unknown, Entered By History     aflibercept (EYLEA) 2 MG/0.05ML SOSY injection prefilled syringe 2 mg by Intravitreal route every 28 days Past Month at Unknown time Yes Unknown, Entered By History     albuterol (PROAIR HFA/PROVENTIL HFA/VENTOLIN  HFA) 108 (90 Base) MCG/ACT inhaler Inhale 1-2 puffs into the lungs every 6 hours as needed for shortness of breath / dyspnea or wheezing 7/7/2022 at Unknown time Yes Unknown, Entered By History     amLODIPine (NORVASC) 10 MG tablet Take 10 mg by mouth every evening 7/7/2022 at am Yes Unknown, Entered By History Yes    aspirin 81 MG EC tablet Take 81 mg by mouth daily 7/7/2022 at am Yes Unknown, Entered By History     atorvastatin (LIPITOR) 10 MG tablet Take 10 mg by mouth daily 7/7/2022 at am Yes Unknown, Entered By History Yes    Calcium Carb-Cholecalciferol 600-800 MG-UNIT TABS Take 1 tablet by mouth daily 7/7/2022 at am Yes Unknown, Entered By History     carvedilol (COREG) 12.5 MG tablet Take 12.5 mg by mouth 2 times daily (with meals) 7/7/2022 at am Yes Unknown, Entered By History Yes    docusate sodium (COLACE) 100 MG capsule Take 200 mg by mouth every evening 7/6/2022 at pm Yes Unknown, Entered By History     fexofenadine (ALLEGRA) 180 MG tablet Take 180 mg by mouth daily 7/7/2022 at am Yes Unknown, Entered By History     fluticasone (FLOVENT HFA) 110 MCG/ACT inhaler Inhale 1 puff into the lungs 2 times daily 7/7/2022 at am Yes Unknown, Entered By History Yes    losartan (COZAAR) 100 MG tablet Take 100 mg by mouth daily 7/7/2022 at am Yes Unknown, Entered By History Yes    melatonin 3 MG tablet Take 3 mg by mouth nightly as needed for sleep 7/6/2022 at pm Yes Unknown, Entered By History     mirtazapine (REMERON) 15 MG tablet Take 15 mg by mouth At Bedtime 7/6/2022 at pm Yes Unknown, Entered By History Yes    multivitamin (CENTRUM SILVER) tablet Take 1 tablet by mouth daily 7/7/2022 at am Yes Unknown, Entered By History     sertraline (ZOLOFT) 50 MG tablet Take 100 mg by mouth daily 7/7/2022 at am Yes Unknown, Entered By History Yes

## 2022-07-08 NOTE — H&P
History and Physical     Selam Sifuentes MRN# [id number]   YOB: 1927 Age: 89 year old      Date of Admission:  7/7/2022    Primary care provider: Renetta Wynn          Assessment and Plan:   Selam Sifuentes is a 89 year old female with a PMH significant for asthma, hypertension, type 2 diabetes, hyperlipidemia, CKD stage III and chronic systolic congestive heart failure who presents after mechanical fall with obvious fracture of her left arm.    Patient was discussed with Dr. Ruiz, who was provider in ED. Chart review of ED work up was reviewed as well as chart review of Care Everywhere, previous visits and admissions.     #Open left comminuted fracture of the distal radius  -Mechanical fall today resulting in open left comminuted fracture of the distal radius with radial angulation of the distal fracture fragment  -Orthopedics called from ED with plans for procedure in a.m.  -Plan to schedule Tylenol, apply ice and will have oxycodone available although patient reports nausea with this  -Continue Ancef  -Preoperative work-up included unremarkable labs but EKG did show left bundle branch block which is not new along with PVCs and a prolonged QTC which are new  -Tetanus up to date in 2021  -Gentle fluids to start at midnight 75 ml/hr  -Ideally we would improve QTC prior to procedure but if magnesium and calcium are normal we recommend just avoiding QTC prolonging medicines      #Prolonged QTC  -QTc prolonged at 500  -Add on magnesium and calcium level, potassium 4.6      #History of systolic congestive heart failure  -No recent echocardiogram to review but last echo in May 2018 showed EF of 40% with mild diffuse hypokinesis and more severe septal hypokinesis with grade 1 diastolic dysfunction  -Appears euvolemic without hypoxia  -Be n.p.o. after midnight so we will hold Lasix for now  -At high risk of volume overload    #Hypertension  -Awaiting formal med rec to resume  medicines  -hydralazine PRN    #Asthma  -Continue daily inhalers and encourage incentive spirometry    #CKD stage III  -Creatinine 0.64          Social: Lives in assisted living and does not use any assistive walking devices  Code: Discussed with patient and they have chosen DNR/DNI  VTE prophylaxis: PCD's  Disposition: Inpatient                    Chief Complaint:   Left arm fracture         History of Present Illness:   Selam Sifuentes is a 89 year old female who presents by EMS with obvious left arm fracture.  She reports she was in her usual state of health and playing bingo today when her foot must of hit a chair and she tripped on the floor bracing with her left arm.  There was obvious deformity of the left arm with bone protrusion.  She had severe pain and EMS was called who brought her to the emergency room.  She does not have any complaints other than slight discomfort for me.  She denies shortness of breath, cough, chest discomfort, nausea, vomiting, diarrhea, abdominal pain and urinary symptoms.  She does not drink alcohol or smoke cigarettes.  She takes her medicines as prescribed without anything new recently.  She does not feel that she tolerates narcotics well             Past Medical History:   Asthma, systolic heart failure, hypertension, CKD stage III          Past Surgical History:     Past Surgical History:   Procedure Laterality Date     APPENDECTOMY       BREAST SURGERY Left 08/01/2016    Lumpectomy     MASTECTOMY Right      SC MASTECTOMY, PARTIAL Left 8/1/2016    Procedure: LEFT LUMPECTOMY AFTER WIRE LOCALIZATION @0730;  Surgeon: Deb Varghese MD;  [place] [facility] [place]  Service: General     TONSILLECTOMY                 Social History:     Social History     Socioeconomic History     Marital status:      Spouse name: Not on file     Number of children: Not on file     Years of education: Not on file     Highest education level: Not on file   Occupational History     Not  on file   Tobacco Use     Smoking status: Never Smoker     Smokeless tobacco: Never Used   Substance and Sexual Activity     Alcohol use: Yes     Comment: Alcoholic Drinks/day: Rarely     Drug use: No     Sexual activity: Not on file   Other Topics Concern     Not on file   Social History Narrative    Patient became a  in February 2017.   had multiple medical issues.. Kids are looking for an assisted living facility for her.     Social Determinants of Health     Financial Resource Strain: Not on file   Food Insecurity: Not on file   Transportation Needs: Not on file   Physical Activity: Not on file   Stress: Not on file   Social Connections: Not on file   Intimate Partner Violence: Not on file   Housing Stability: Not on file               Family History:     Family History   Problem Relation Age of Onset     Depression Mother      Prostate Cancer Father      Hypertension Sister      Breast Cancer No family hx of               Allergies:      Allergies   Allergen Reactions     Codeine      Erythromycin      Sulfa Drugs                Medications:     Prior to Admission medications    Medication Sig Last Dose Taking? Auth Provider Long Term End Date   ALBUTEROL 90 MCG/ACT IN AERS as directed        ALENdronate (FOSAMAX) 70 MG tablet Take 70 mg by mouth every 7 days.   Reported, Patient     ALLEGRA 180 MG OR TABS TAKE ONE DAILY        CHOLESTYRAMINE POWD as directed        CIPRO HC 0.2-1 % OT SUSP 5-8 drops lt. ear bid x 5-6 days   Eduardo Warner MD     CLONAZEPAM 0.5 MG OR TABS ONE TABLET 3 TIMES DAILY        cyclobenzaprine (FLEXERIL) 5 MG tablet Take 1 tablet by mouth 3 times daily as needed for muscle spasms.   Tyler Abdul PA-C     DIGITEK 0.25 MG OR TABS 1 TABLET DAILY        FLONASE INHA 50 MCG/DOSE NA INHALE 2 SPRAYS IN EACH NOSTRIL ONCE DAILY        FUROSEMIDE 40 MG OR TABS ONE DAILY IN THE MORNING        LEXAPRO 10 MG OR TABS ONE DAILY        lisinopril (LISINOPRIL PO) Take 10 mg by mouth  daily.   Reported, Patient     Metoprolol Succinate (TOPROL XL PO) Take  by mouth.   Reported, Patient     SIMVASTATIN    Reported, Patient     TRICOR 145 MG OR TABS ONE TABLET DAILY        Valsartan (DIOVAN PO) Take  by mouth.   Reported, Patient                Review of Systems:   A Comprehensive greater than 10 system review of systems was carried out.  Pertinent positives and negatives are noted above.  Otherwise negative for contributory information.            Physical Exam:   Blood pressure (!) 183/103, pulse 78, temperature 98.9  F (37.2  C), temperature source Oral, resp. rate 18, SpO2 94 %.  Exam:  GENERAL:  Comfortable.  PSYCH: pleasant, oriented, No acute distress.  HEENT:  PERRLA. Normal conjunctiva, normal hearing, nasal mucosa and Oropharynx are normal.  NECK:  Supple, no neck vein distention, adenopathy or bruits, normal thyroid.  HEART:  Normal S1, S2 with no murmur, no pericardial rub, gallops or S3 or S4.  LUNGS:  Clear to auscultation, normal Respiratory effort. No wheezing, rales or ronchi.  ABDOMEN:  Soft, no hepatosplenomegaly, normal bowel sounds. Non-tender, non distended.   EXTREMITIES: Left arm is splinted and Ace wrapped  SKIN:  Dry to touch, No rash, wound or ulcerations.  NEUROLOGIC:  CN 2-12 grossly intact, sensation is intact with no focal deficits.               Data:     Recent Labs   Lab 07/07/22  1808   WBC 10.7   HGB 13.9   HCT 44.9   MCV 93        Recent Labs   Lab 07/07/22  1808      POTASSIUM 4.6   CHLORIDE 103   CO2 31   ANIONGAP 4   *   BUN 22   CR 0.64   GFRESTIMATED 81   JOHN 9.3     No results for input(s): COLOR, APPEARANCE, URINEGLC, URINEBILI, URINEKETONE, SG, UBLD, URINEPH, PROTEIN, UROBILINOGEN, NITRITE, LEUKEST, RBCU, WBCU in the last 168 hours.      Recent Results (from the past 24 hour(s))   XR Knee Left 3 Views    Narrative    EXAM: XR KNEE LEFT 3 VIEWS  LOCATION: [facility]  DATE/TIME: 7/7/2022 5:09 PM    INDICATION:  Trauma, pain.  COMPARISON: None.      Impression    IMPRESSION: The left knee is negative for fracture or compartmental narrowing. No effusion.   XR Wrist Left 2 Views    Narrative    EXAM: XR WRIST LEFT 2 VIEWS  LOCATION: [facility]  DATE/TIME: 7/7/2022 5:09 PM    INDICATION: Trauma, pain.    COMPARISON: None.      Impression    IMPRESSION: Comminuted fracture of the distal radius with radial angulation of the distal fracture fragment. Additional fracture of the distal ulna both across the base of the ulnar styloid and again approximately 1.5 cm proximal to the wrist joint.   There is radial angulation/deviation of these distal fracture fragments as well. No carpal fractures are seen. There is some calcification along the dorsal aspect of the wrist, but this appears more likely chronic. Soft tissue swelling about the wrist.   Normal carpal alignment. Advanced degenerative change at the STT joint.   XR Wrist Left 2 Views    Narrative    EXAM: XR WRIST LEFT 2 VIEW  LOCATION: [facility]  DATE/TIME: 7/7/2022 7:43 PM    INDICATION: Post reduction.  COMPARISON: 7/7/2022.      Impression    IMPRESSION: Minimal interval improvement in the angulation of the fractures of the distal radius and ulna but there is still radial deviation and angulation of both distal fracture fragments. No new fractures are visualized.         Renetta Deras PA-C    This patient was seen and discussed with Dr. Templeton who agrees with the current plans as outlined above.

## 2022-07-08 NOTE — PLAN OF CARE
Goal Outcome Evaluation:    Patient vital signs are at baseline: No,  Reason:  elevated BP  Patient able to ambulate as they were prior to admission or with assist devices provided by therapies during their stay:  Yes  Patient MUST void prior to discharge:  Yes, matias  Patient able to tolerate oral intake:  No,  Reason:  NPO for surgery today  Pain has adequate pain control using Oral analgesics:  No,  Reason:  PRN oxycodone and scheduled medications, see MAR  Does patient have an identified :  No,  Reason:  Possible discharge to TCU  Has goal D/C date and time been discussed with patient:  No,  Reason:  SW and PT/OT after surgery    Pt A/O x4. CMS intact. Splint and ace wrap to left arm. LR infusing. IV ancef. K+ and mag protocol. Plan for surgery today at 9:30. Will continue to monitor.

## 2022-07-08 NOTE — PROGRESS NOTES
Ortho Brief -     89 yo F with L open distal radius fracture.    Rec  Ancef q8H  NPO p MN  Elevate  Plan I&D and ORIF Friday    Delvin Johnson MD

## 2022-07-09 NOTE — PROGRESS NOTES
Orthopedic Surgery  Selam Sifuentes  DATE: 07/09/2022    Admit Date:  7/7/2022    A/P:  Selam Sifuentes is a 89 year old admitted on 7/7/2022 with open left wrist fracture.    #Open fracture left wrist s/p I&D open distal third ulna fracture with ORIF left distal radius, ORIF left distal ulna, and CRPP left distal radius, 7/8/2022  #1 Day Post-Op  No periop complications noted.  Progressing.  --  NWB LUE  --  Sugar tong splint; transition to short arm cast on 7/22 (2 weeks).  Keep splint c/d/i.    --  Sling for comfort  --  OK to use platform walker or even consider quad cane with right arm  --  Elevate! Ice!   --  Periop Ancef..  Doesn't need prophylactic Abx for pinning  --  Pain control; minimize narcotics  --  Mobilize with PT/OT     #Disposition  -  Await therapy recommendations  -  Follow-up with [facility] in 2 weeks for transition to short arm cast and again in 5-6 weeks for cast removal and removal of left distal pin.        Lashae Deras [facility]  [phone]  Text Page (7AM - 5PM)    _________________________________________________________    INTERVAL HISTORY:  Resting comfortably in bed.  Knee pain better.  Left arm bugging her but has intact movement of fingers and good sensation.        PHYSICAL EXAM:  Vital Signs: Temp: 97.9  F (36.6  C) Temp src: Temporal BP: (!) 148/68 Pulse: 78   Resp: 20 SpO2: 97 % O2 Device: Nasal cannula Oxygen Delivery: 2 LPM  I/O last 3 completed shifts:  In: 2200 [P.O.:370; I.V.:1830]  Out: 2070 [Urine:2020; Blood:50]  Vitals:    07/07/22 2039 07/09/22 0226 07/09/22 0504   Weight: 57.5 kg (126 lb 12.8 oz) 56.1 kg (123 lb 11.2 oz) (P) 64 kg (141 lb)       Pleasant alert and oriented  Appears younger than stated age  Left arm in post-op splint elevated on pillow.    Left digits swollen but movement is full and sensation intact and equal to right hand      LABORATORY DATA:  I reviewed all medications, new labs and imaging results over the last 24  hours.  Recent Labs   Lab Test 07/09/22  0714 07/08/22  0733 07/07/22  1808    141 138   POTASSIUM 3.6 3.9 4.6   BUN  --  16 22   CR  --  0.64 0.64     Recent Labs   Lab Test 07/09/22  0714 07/08/22 0733 07/07/22  1808   WBC  --  9.3 10.7   HGB 11.3* 12.0 13.9   PLT  --  192 227     No lab results found.    Results for orders placed or performed during the hospital encounter of 07/07/22   XR Wrist Left 2 Views    Narrative    EXAM: XR WRIST LEFT 2 VIEWS  LOCATION: [facility]  DATE/TIME: 7/7/2022 5:09 PM    INDICATION: Trauma, pain.    COMPARISON: None.      Impression    IMPRESSION: Comminuted fracture of the distal radius with radial angulation of the distal fracture fragment. Additional fracture of the distal ulna both across the base of the ulnar styloid and again approximately 1.5 cm proximal to the wrist joint.   There is radial angulation/deviation of these distal fracture fragments as well. No carpal fractures are seen. There is some calcification along the dorsal aspect of the wrist, but this appears more likely chronic. Soft tissue swelling about the wrist.   Normal carpal alignment. Advanced degenerative change at the STT joint.   XR Knee Left 3 Views    Narrative    EXAM: XR KNEE LEFT 3 VIEWS  LOCATION: [facility]  DATE/TIME: 7/7/2022 5:09 PM    INDICATION: Trauma, pain.  COMPARISON: None.      Impression    IMPRESSION: The left knee is negative for fracture or compartmental narrowing. No effusion.   XR Wrist Left 2 Views    Narrative    EXAM: XR WRIST LEFT 2 VIEW  LOCATION: [facility]  DATE/TIME: 7/7/2022 7:43 PM    INDICATION: Post reduction.  COMPARISON: 7/7/2022.      Impression    IMPRESSION: Minimal interval improvement in the angulation of the fractures of the distal radius and ulna but there is still radial deviation and angulation of both distal fracture fragments. No new fractures are visualized.   XR Surgery LANE L/T 5  Min Fluoro w Stills    Narrative    This exam was marked as non-reportable because it will not be read by a   radiologist or a [place] non-radiologist provider.

## 2022-07-09 NOTE — PLAN OF CARE
INPATIENT NOTE: 1900-0700     PRIMARY PROBLEM: Type I or II open fracture of distal end of left radius, left ulna        Vital signs:  Temp: 97.9  F (36.6  C) Temp src: Temporal BP: (!) 148/68 Pulse: 78   Resp: 20 SpO2: 97 % O2 Device: Nasal cannula Oxygen Delivery: 2 LPM Height: 152.4 cm (5') Weight: 57.5 kg (126 lb 12.8 oz)  Estimated body mass index is 24.76 kg/m  as calculated from the following:    Height as of this encounter: 1.524 m (5').    Weight as of this encounter: 57.5 kg (126 lb 12.8 oz).        Orientation: Alert and Oriented x4  Neuro: Intact   Pain status: complaining of 4/10 pain in their left arm Tylenol and Oxycodone given for pain.  Medicatons decreased pain.   Resp: Lung sounds are Clear. Denies any SOB.   Cardiac: WNL, Denies any Chest Pain   GI: Bowels are active x 4 Quads. Last BM 7/8/22  : Voiding with no concern .purewick in place  Skin: WNL   Peripheral edema: left arm and left knee  LDA: Peripheral IV   Infusions: Patient has Lactated Ringer's running at 75 mL per hour.   Pertinent Labs:   Diet: Regular  Activity: are 2 assist with Gait Belt and Walker  Consults: SW/PT/OT  Discharge Plan:   Major Shift Event: pt has serosanguinous drainage from the surgical dressing. Dressing was assessed. CMS intact. Surgical dressing was enforced with ABD and Kerlix twice. Left hand elevated with a pillow and ice applied to surgical site. Pt stated having sore throat and MD mcclain. MD ordered Bensocaine-menthol. Medication administered.     Will continue to monitor and provide cares.     Jamie Bryan RN

## 2022-07-09 NOTE — CONSULTS
Care Management Initial Consult    General Information  Assessment completed with: Patient, Family, son Maty  Type of CM/SW Visit: Initial Assessment    Primary Care Provider verified and updated as needed: Yes   Readmission within the last 30 days: no previous admission in last 30 days      Reason for Consult: care coordination/care conference, discharge planning  Advance Care Planning:            Communication Assessment  Patient's communication style: spoken language (English or Bilingual)    Hearing Difficulty or Deaf: yes   Wear Glasses or Blind: yes    Cognitive  Cognitive/Neuro/Behavioral: .WDL except, mood/behavior           Mood/Behavior: anxious          Living Environment:   People in home: alone     Current living Arrangements: apartment      Able to return to prior arrangements: yes       Family/Social Support:  Care provided by: self  Provides care for: no one     Children          Description of Support System: Supportive, Involved    Support Assessment: Adequate family and caregiver support    Current Resources:   Patient receiving home care services: No     Community Resources: None  Equipment currently used at home: none  Supplies currently used at home:      Employment/Financial:  Employment Status: retired        Financial Concerns:             Lifestyle & Psychosocial Needs:  Social Determinants of Health     Tobacco Use: Low Risk      Smoking Tobacco Use: Never Smoker     Smokeless Tobacco Use: Never Used   Alcohol Use: Not on file   Financial Resource Strain: Not on file   Food Insecurity: Not on file   Transportation Needs: Not on file   Physical Activity: Not on file   Stress: Not on file   Social Connections: Not on file   Intimate Partner Violence: Not on file   Depression: Not on file   Housing Stability: Not on file       Functional Status:  Prior to admission patient needed assistance:   Dependent ADLs:: Independent  Dependent IADLs:: Independent       Mental Health  Status:  Mental Health Status: No Current Concerns       Chemical Dependency Status:  Chemical Dependency Status: No Current Concerns             Values/Beliefs:  Spiritual, Cultural Beliefs, Jehovah's witness Practices, Values that affect care: no               Additional Information:  Pt admitted with open left distal radius and ulna fractures from a mechanical fall     CM consulted for discharge planning.  Met with pt, son and DIL at bedside to discuss discharge planning.  Pt lives independently in senior living facility, not receiving any services.Grand daughter Colette is the point person for the family.    Therapy is recommending TCU, pt/family in agreement. Pt/family were provided with the Medicare Compare list for SNF.  Discussed associated Medicare star ratings to assist with choice for referrals/discharge planning Yes.  Also gave then the SNF packet.    Education was given to pt/family that star ratings are updated/maintained by Medicare and can be reviewed by visiting www.medicare.gov Yes    Private room fee discussed. Pt said she does NOT want a private room and is fully vaccinated.    They provided top 2 choices of [facility] and [facility]- referrals were sent.    Pt and family believe she is able to get into family car for transport.     Tiffany Irby RN, BSN, PHN, CCM  Care Coordinator  [facility]  [phone]  .

## 2022-07-09 NOTE — PROGRESS NOTES
[facility]    Medicine Progress Note - Hospitalist Service    Date of Admission:  7/7/2022    Assessment & Plan          Selam Sifuentes is a 89 year old female with a PMH significant for asthma, hypertension, type 2 diabetes, hyperlipidemia, CKD stage III and chronic systolic congestive heart failure who presents after mechanical fall with obvious fracture of her left arm.     1.  Open left radius and ulna fractures.  -Initially started on IV cefazolin.  -Status postrepair by orthopedic surgery on 7/8.  -Pain medications as needed.  -Use incentive spirometry.  -Work with therapy.     2.  Hypertension.  -Continue carvedilol 12.5 mg twice a day.  -Restart losartan 25 mg a day.  -Restart amlodipine 5 mg a day.  -IV hydralazine if needed.     3.  Left knee pain.    -No acute fracture noted on x-ray.  -Pain medications as needed.     4.  Hyperlipidemia.  -Continue atorvastatin 10 mg a day.     5.  Depression.  -Continue sertraline 100 mg a day.  -Continue mirtazapine 15 mg at bedtime.       Diet: Advance Diet as Tolerated: Regular Diet Adult    DVT Prophylaxis: Pneumatic Compression Devices and aspirin  Mon Catheter: Not present  Central Lines: None  Cardiac Monitoring: None  Code Status: No CPR- Do NOT Intubate      Disposition Plan      Expected Discharge Date: 07/11/2022                    Louie Melendez DO  Hospitalist Service  [facility]  Securely message with the Vocera Web Console (learn more here)  Text page via [facility] Paging/Directory         Clinically Significant Risk Factors Present on Admission               # Overweight: Estimated body mass index is 27.54 kg/m  (pended) as calculated from the following:    Height as of this encounter: 1.524 m (5').    Weight as of this encounter: (P) 64 kg (141 lb).        ______________________________________________________________________    Interval History   Having left arm pain.  Some nausea.  Left leg pain  much improved.  Denies chest pain, shortness of breath, fevers, chills.    Data reviewed today: I reviewed all medications, new labs and imaging results over the last 24 hours.    Physical Exam   Vital Signs: Temp: 98  F (36.7  C) Temp src: Temporal BP: (!) 140/67 Pulse: 69   Resp: 22 SpO2: 93 % O2 Device: Nasal cannula Oxygen Delivery: 2 LPM  Weight: 123 lbs 11.2 oz  Gen:  NAD, A&O seemingly x3.  Eyes:  PERRL, sclera anicteric.  OP:  MMM, no lesions.  Neck:  Supple.  CV:  Regular, no murmurs.  Lung:  CTA b/l, normal effort.  Ab:  +BS, soft.  Skin:  Warm, dry to touch.  No rash.  Left arm dressing not removed.  Ext:  No pitting edema LE b/l.      Data   Recent Labs   Lab 07/09/22  0714 07/08/22  1154 07/08/22  0733 07/07/22  1808   WBC  --   --  9.3 10.7   HGB 11.3*  --  12.0 13.9   MCV  --   --  92 93   PLT  --   --  192 227     --  141 138   POTASSIUM 3.6  --  3.9 4.6   CHLORIDE 99  --  107 103   CO2 29  --  29 31   BUN 8  --  16 22   CR 0.49*  --  0.64 0.64   ANIONGAP 5  --  5 4   JOHN 8.1*  --  8.6 9.2  9.3   *  142* 129* 86 136*

## 2022-07-09 NOTE — PLAN OF CARE
Goal Outcome Evaluation:    Plan of Care Reviewed With: patient, daughter     Overall Patient Progress: improving    Pt returned back from surgery @ 1300.     Patient vital signs are at baseline: Yes-requiring 2L O2.  Patient able to ambulate as they were prior to admission or with assist devices provided by therapies during their stay:  No,  Reason:  Pt Ax2, using gait belt, to bedside commode. NWB to LUE.   Patient MUST void prior to discharge:  Yes  Patient able to tolerate oral intake:  Yes  Pain has adequate pain control using Oral analgesics:  Yes-PO scheduled tylenol.  Does patient have an identified :  No,  Reason:  Pt from independent living.   Has goal D/C date and time been discussed with patient:  Yes-TBD pending therapy tomorrow.     Pt A&O x4. CMS: numbness to LUE-block in place. Ace wrap applied to L knee per pt request for swelling & pain.

## 2022-07-09 NOTE — PROGRESS NOTES
07/09/22 1000   Quick Adds   Type of Visit Initial Occupational Therapy Evaluation   Living Environment   Living Environment Comments pt questionable historian at this time vs [place]. reports she lives in [facility] alone. walk in shower   Self-Care   Usual Activity Tolerance good   Current Activity Tolerance fair   Equipment Currently Used at Home none   Fall history within last six months no   Activity/Exercise/Self-Care Comment reports indp at baseline   Instrumental Activities of Daily Living (IADL)   IADL Comments unclear. pt reports she manages own medication. unclear if she has any services at [facility]   General Information   Onset of Illness/Injury or Date of Surgery 07/07/22   Referring Physician Júnior Fishman PA-C   Patient/Family Therapy Goal Statement (OT) to go home   Additional Occupational Profile Info/Pertinent History of Current Problem Selam Sifuentes is a 89 year old female with a PMH significant for asthma, hypertension, type 2 diabetes, hyperlipidemia, CKD stage III and chronic systolic congestive heart failure who presents after mechanical fall with obvious fracture of her left arm.   Existing Precautions/Restrictions fall   Left Upper Extremity (Weight-bearing Status) non weight-bearing (NWB)   General Observations and Info per orders is ok to use platform walker   Cognitive Status Examination   Orientation Status orientation to person, place and time   Cognitive Status Comments [place] vs cog deficits   Visual Perception   Visual Impairment/Limitations corrective lenses full-time   Pain Assessment   Patient Currently in Pain Yes, see Vital Sign flowsheet   Integumentary/Edema   Integumentary/Edema Comments CDI dressing   Posture   Posture protracted shoulders;kyphosis   Range of Motion Comprehensive   Comment, General Range of Motion WFL for ADL. L arm splinted. does have AROM at SH to about 50%. PROM at L %   Strength Comprehensive (MMT)   Comment, General Manual Muscle Testing (MMT)  Assessment generalized deconditioning and weakness.   Coordination   Upper Extremity Coordination No deficits were identified   Bed Mobility   Bed Mobility supine-sit   Supine-Sit [place] (Bed Mobility) contact guard   Activities of Daily Living   BADL Assessment/Intervention upper body dressing;lower body dressing   Upper Body Dressing Assessment/Training   [place] Level (Upper Body Dressing) dependent (less than 25% patient effort);maximum assist (25% patient effort)   Lower Body Dressing Assessment/Training   [place] Level (Lower Body Dressing) maximum assist (25% patient effort)   Clinical Impression   Criteria for Skilled Therapeutic Interventions Met (OT) Yes, treatment indicated   OT Diagnosis decreased function in ADL   OT Problem List-Impairments impacting ADL problems related to;activity tolerance impaired;cognition;flexibility;mobility;range of motion (ROM);strength;pain;post-surgical precautions   Assessment of Occupational Performance 5 or more Performance Deficits   Identified Performance Deficits bathing, dressing, toileting, mobility, home mgmt, transfers   Planned Therapy Interventions (OT) ADL retraining;IADL retraining;progressive activity/exercise;ROM   Clinical Decision Making Complexity (OT) low complexity   Anticipated Equipment Needs Upon Discharge (OT) walker, platform  (L)   Risk & Benefits of therapy have been explained evaluation/treatment results reviewed;care plan/treatment goals reviewed;risks/benefits reviewed;current/potential barriers reviewed;participants voiced agreement with care plan;participants included;patient   Clinical Impression Comments decreased function in ADL warrants skilled IP OT tx   OT Discharge Planning   OT Discharge Recommendation (DC Rec) Transitional Care Facility   OT Rationale for DC Rec pt below baseline function in self care and ADL. requires A of 1 and lives in ILF apartment. will not be able to care for self safety. needs TCU at discharge  to increase function and safety for ADL and transfer. currently using platform walker. anticipate OT/PT to reassess at TCU for ADL/DME needs   Total Evaluation Time (Minutes)   Total Evaluation Time (Minutes) 10   OT Goals   Therapy Frequency (OT) Daily   OT Predicted Duration/Target Date for Goal Attainment 07/14/22   OT Goals Hygiene/Grooming;Upper Body Dressing;OT Goal 1;Toilet Transfer/Toileting   OT: Hygiene/Grooming while standing;minimal assist;within precautions;using adaptive equipment   OT: Upper Body Dressing Moderate assist;within precautions   OT: Toilet Transfer/Toileting Minimal assist;toilet transfer;cleaning and garment management;using adaptive equipment;within precautions   OT: Goal 1 Pt will demo HEP to SH, wrist, hand with VC and SBA

## 2022-07-10 NOTE — PROGRESS NOTES
[facility]  Hospitalist Progress Note  Artemio Elizondo MD 07/10/2022    Reason for Stay (Diagnosis): HAWA jaramillo         Assessment and Plan:      Summary of Stay: Selam Sifuentes is a 89 year old female with a PMH significant for asthma, hypertension, type 2 diabetes, hyperlipidemia, CKD stage III and chronic systolic congestive heart failure who presented after a mechanical fall with fracture of her left arm.  She underwent surgical repair on 7/8/22.    Plans today  - increase losartan to 50 mg daily     1.  Open left radius and ulna fractures.  -Status post surgical repair by orthopedic surgery on 7/8.  -Pain medications as needed.  -Use incentive spirometry.  - PT/OT     2.  Hypertension.  -Continue carvedilol 12.5 mg twice a day.  -on losartan 25 mg a day - will increase to 50 mg daily  -continue amlodipine 5 mg a day.  -IV hydralazine if needed.     3.  Left knee pain.    -No acute fracture noted on x-ray.  -Pain medications as needed.     4.  Hyperlipidemia.  -Continue atorvastatin 10 mg a day.     5.  Depression.  -Continue sertraline 100 mg a day.  -Continue mirtazapine 15 mg at bedtime.        Diet: Advance Diet as Tolerated: Regular Diet Adult    DVT Prophylaxis: Pneumatic Compression Devices and aspirin  Mon Catheter: Not present  Central Lines: None  Cardiac Monitoring: None  Code Status: No CPR- Do NOT Intubate   DISPO:  TCU on discharge when bed is found         Interval History (Subjective):      No complaints.  Pain controlled.  Await TCU placement                  Physical Exam:      Last Vital Signs:  BP (!) 147/68 (BP Location: Right leg)   Pulse 65   Temp 97.8  F (36.6  C) (Temporal)   Resp 18   Ht 1.524 m (5')   Wt 59 kg (130 lb)   SpO2 98%   BMI 25.39 kg/m        Intake/Output Summary (Last 24 hours) at 7/10/2022 1534  Last data filed at 7/10/2022 0956  Gross per 24 hour   Intake 360 ml   Output --   Net 360 ml       Constitutional: Awake, alert, cooperative,  no apparent distress   Respiratory: Clear to auscultation bilaterally, no crackles or wheezing   Cardiovascular: Regular rate and rhythm, normal S1 and S2, and no murmur noted   Abdomen: Normal bowel sounds, soft, non-distended, non-tender   Skin: No rashes, no cyanosis, dry to touch   Neuro: Alert and oriented x3, no weakness, numbness, memory loss   Extremities: No edema, normal range of motion   Other(s):        All other systems: Negative          Medications:      All current medications were reviewed with changes reflected in problem list.         Data:      All new lab and imaging data was reviewed.   Labs:  Recent Labs   Lab 07/10/22  0612 07/09/22  0714 07/08/22  1154 07/08/22  0733 07/07/22  1808   NA  --  133  --  141 138   POTASSIUM 3.8 3.6  --  3.9 4.6   CHLORIDE  --  99  --  107 103   CO2  --  29  --  29 31   ANIONGAP  --  5  --  5 4   GLC  --  142*  142* 129* 86 136*   BUN  --  8  --  16 22   CR  --  0.49*  --  0.64 0.64   GFRESTIMATED  --  87  --  81 81   JOHN  --  8.1*  --  8.6 9.2  9.3     Recent Labs   Lab 07/10/22  0612   WBC 9.6   HGB 11.1*   HCT 35.0   MCV 91         Imaging:   No results found for this or any previous visit (from the past 24 hour(s)).

## 2022-07-10 NOTE — PLAN OF CARE
Goal Outcome Evaluation:    Plan of Care Reviewed With: patient     Pt A/O but forgetful.  VSS and afebrile. Lung sounds diminished, on room air. Pain managed adequately with scheduled tylenol, PRN oxycodone, and ice. Dressing medium drainage reinforced with Kerlex. Up Ax1 with walker and gait belt. Voiding adequately. No BM but passing gas - given Senna. Poor appetite. Plan is discharge to TCU. Will continue to monitor.

## 2022-07-10 NOTE — PLAN OF CARE
Goal Outcome Evaluation:    Plan of Care Reviewed With: patient               Day nurse from 0700 to 1230  Pt alert and orientated but forgetful and hard of hearing so she translates some things wrong when asked. Pt felt nausea around 0730 and was given zofran with relief. Pt had oxycodone around 0600. Staff gave pt just scheduled tylenol for the rest of the morning. Pt up with PT and sat in chair. No appetite yet. Pt drinking well so saline locked. Pt voiding. Plan is home vs TCU.

## 2022-07-10 NOTE — PLAN OF CARE
A&Ox4, [place]. VSS on 1L O2 via NC. Denies pain. CMS intact except for bruising. Up with assist of 1, gait belt, and platform walker. NWB to LUE, splinted. Dressing with moderate amount of serosanguineous drainage, dressing reinforced. LS clear. +BS. Incontinent of bladder at times. IV SL. PT/OT following. Discharge pending TCU. Nursing will continue to monitor.

## 2022-07-10 NOTE — PROGRESS NOTES
Care Management Follow Up    Length of Stay (days): 3    Expected Discharge Date: 07/11/2022     Concerns to be Addressed:     TCU  Patient plan of care discussed at interdisciplinary rounds: Yes    Anticipated Discharge Disposition: Transitional Care     Anticipated Discharge Services:  therapy  Anticipated Discharge DME:  TBD    Patient/family educated on Medicare website which has current facility and service quality ratings:  yes  Education Provided on the Discharge Plan:    Patient/Family in Agreement with the Plan: yes    Referrals Placed by CM/SW:  TCU  Private pay costs discussed: Not applicable    Additional Information:  Received a call from pt granddaughter/POA Colette p [phone] requesting that any decisions regarding discharge planning be run through her.  She said that they would [facility] because it would be easier for family to visit the pt.  Colette did speak with [facility] and they told her they do NOT have the ability/staffing to support pt at this time.      Addendum 1115  Spoke with Sangeetha from admission at [facility] who said that the earliest they might have a bed is Tuesday 7/12.  She is aware this is the pt/family first choice and will be in contact tomorrow with update.      Addendum 1300  Met with pt, dtr and granddaughter to discuss discharge planning.  Family really wants [facility].  CM did inform family that [facility] does not have a bed tomorrow, but maybe Tuesday. Pt said the hospitalist told her she would most likely discharge tomorrow, pt dtr said she did not think pt was ready to discharge and they would appeal discharge tomorrow if pt discharged.  CM listened empathically and asked them to see what tomorrow brings.       Tiffany Irby RN, BSN, PHN, CCM  Care Coordinator  [facility]  [phone]

## 2022-07-10 NOTE — PLAN OF CARE
PT: Received orders for evaluation and treatment.  Patient admitted with mechanical fall and fracture of left arm.  Patient seen by Occupational Therapy.  Occupational Therapy indicated patient is unable to ambulate without min A.  Patient lives alone, unable to return to home safely at this time.  Occupational Therapy has recommended TCU to increase independence with mobility.  Patient will be followed by Occupational Therapy for therapy needs during inpatient stay.  Physical therapy will be initiated at next level of care.  Will complete PT order at this time.

## 2022-07-11 NOTE — PROGRESS NOTES
Care Management Follow Up    Length of Stay (days): 4    Expected Discharge Date: 07/12/2022     Concerns to be Addressed:       Patient plan of care discussed at interdisciplinary rounds: Yes    Anticipated Discharge Disposition: Transitional Care     Anticipated Discharge Services:    Anticipated Discharge DME:      Patient/family educated on Medicare website which has current facility and service quality ratings:    Education Provided on the Discharge Plan:    Patient/Family in Agreement with the Plan: yes    Referrals Placed by CM/SW:    Private pay costs discussed: Not applicable    Additional Information:    Per chart review, pt will remain in hospital until 7/12. Left  for [facility] ([phone]) to determine if they still had bed availability for 7/12. Will wait for call back for confirmation. SW following.     Addendum [id number]    Attempted to call [facility] for a second time and did not receive an answer. SW following.     MONO Wolf, LGSW  Inpatient Care Coordination  Ortho/Spine Unit  [phone]  MARYBEL Wolf

## 2022-07-11 NOTE — PLAN OF CARE
Goal Outcome Evaluation:        Patient vital signs are at baseline: Yes  Patient able to ambulate as they were prior to admission or with assist devices provided by therapies during their stay:  Yes  Patient MUST void prior to discharge:  Yes  Patient able to tolerate oral intake:  Yes  Pain has adequate pain control using Oral analgesics:  Yes  Does patient have an identified :  Yes  Has goal D/C date and time been discussed with patient:  Yes           Patient aox4. RA. Saline locked. [place]. Limb alert R arm. BP taken on legs. /96.  PRN hydralazine administered. SBP rechecked at 170. One time dose of amlodipine and carvedilol administered. Scheduled doses increased. Denies pain. LUE dressing CDI. Mild edema in left hand. CMS intact. Up with assist of 1 gaitbelt. CARLEY SHAIKH. Discharge to [facility] tomorrow.

## 2022-07-11 NOTE — PROGRESS NOTES
[facility]  Hospitalist Progress Note  Artemio Elizondo MD 07/11/2022    Reason for Stay (Diagnosis): HAWA jaramillo         Assessment and Plan:      Summary of Stay: Selam Sifuentes is a 89 year old female with a PMH significant for asthma, hypertension, type 2 diabetes, hyperlipidemia, CKD stage III and chronic systolic congestive heart failure who presented after a mechanical fall with fracture of her left arm.  She underwent surgical repair on 7/8/22.    Course complicated by uncontrolled BP today.  No sx.  See plans for med titration below     Plans today  - increase Coreg to 25 mg bid  - increase amlodipine to 10 mg daily  - continue increased dose of losartan  - anticipate discharge to TCU after BP better managed     1.  Open left radius and ulna fractures.  -Status post surgical repair by orthopedic surgery on 7/8.  -Pain medications as needed.  -Use incentive spirometry.  - PT/OT     2.  Hypertension.  -home dose is carvedilol 12.5 mg twice a day - increased to 25 mg bid.  -on losartan 25 mg a day - will increase to 50 mg daily  -home dose is amlodipine 5 mg a day - increased to 10 mg daily.  -IV hydralazine if needed.     3.  Left knee pain.    -No acute fracture noted on x-ray.  -Pain medications as needed.     4.  Hyperlipidemia.  -Continue atorvastatin 10 mg a day.     5.  Depression.  -Continue sertraline 100 mg a day.  -Continue mirtazapine 15 mg at bedtime.    Addendum:  I called and updated sari Alvarenga        Diet: Advance Diet as Tolerated: Regular Diet Adult    DVT Prophylaxis: Pneumatic Compression Devices and aspirin  Mon Catheter: Not present  Central Lines: None  Cardiac Monitoring: None  Code Status: No CPR- Do NOT Intubate   DISPO:  TCU on discharge when bed is found            Interval History (Subjective):      No complaints.  Pain controlled.  Anticipates TCU                  Physical Exam:      Last Vital Signs:  BP (!) 143/59   Pulse 78   Temp 97  F (36.1   C) (Temporal)   Resp 16   Ht 1.524 m (5')   Wt 59 kg (130 lb)   SpO2 94%   BMI 25.39 kg/m        Intake/Output Summary (Last 24 hours) at 7/11/2022 1520  Last data filed at 7/10/2022 1905  Gross per 24 hour   Intake 240 ml   Output --   Net 240 ml       Constitutional: Awake, alert, cooperative, no apparent distress   Respiratory: Clear to auscultation bilaterally, no crackles or wheezing   Cardiovascular: Regular rate and rhythm, normal S1 and S2, and no murmur noted   Abdomen: Normal bowel sounds, soft, non-distended, non-tender   Skin: No rashes, no cyanosis, dry to touch   Neuro: Alert and oriented x3, no weakness, numbness, memory loss   Extremities: No edema, normal range of motion   Other(s): L hand warm, sensation intact       All other systems: Negative          Medications:      All current medications were reviewed with changes reflected in problem list.         Data:      All new lab and imaging data was reviewed.   Labs:  Recent Labs   Lab 07/11/22  0703 07/11/22  0606 07/10/22  1626 07/10/22  0612 07/09/22  0714 07/08/22  1154 07/08/22  0733 07/07/22  1808   NA  --   --   --   --  133  --  141 138   POTASSIUM  --  3.7  --  3.8 3.6  --  3.9 4.6   CHLORIDE  --   --   --   --  99  --  107 103   CO2  --   --   --   --  29  --  29 31   ANIONGAP  --   --   --   --  5  --  5 4   *  --  124*  --  142*  142*   < > 86 136*   BUN  --   --   --   --  8  --  16 22   CR  --   --   --   --  0.49*  --  0.64 0.64   GFRESTIMATED  --   --   --   --  87  --  81 81   JOHN  --   --   --   --  8.1*  --  8.6 9.2  9.3    < > = values in this interval not displayed.     Recent Labs   Lab 07/10/22  0612   WBC 9.6   HGB 11.1*   HCT 35.0   MCV 91         Imaging:   No results found for this or any previous visit (from the past 24 hour(s)).

## 2022-07-11 NOTE — PROGRESS NOTES
Orthopedic Surgery  Selam Sifuentes  07/11/2022     Admit Date:  7/7/2022    POD: 3 Days Post-Op   Procedure(s):  1.  Sharp excisional debridement of skin, subcutaneous tissue, fat, fascia with curettage of bone, left ulna open fracture. 2.  Open reduction and spanning internal fixation of left distal radius. 3.  Closed reduction and percutaneous pinning, left distal radius. 4.  Open reduction and internal fixation of left distal ulna.    Alert and oriented.   Patient upright in a chair.    Pain controlled.  Tolerating oral intake.    Denies nausea or vomiting  Denies chest pain or shortness of breath    Temp:  [97  F (36.1  C)-98.9  F (37.2  C)] 97  F (36.1  C)  Pulse:  [55-78] 78  Resp:  [16-18] 16  BP: (134-190)/(47-96) 143/59  SpO2:  [90 %-99 %] 94 %    LUE:  Left sugar tong splint was reinforced with Kerlix due to drainage through ACE wrap at distal aspect. Taken down at bedside and no active bleeding observed through splint padding. Splint intact.  Some swelling in digits; warm and well perfused.  Sensation intact to all digits.  Active range of motion all digits, can make okay sign, strength intact and minimal discomfort with exam.     Labs:  Recent Labs   Lab Test 07/10/22  0612 07/09/22  0714 07/08/22  0733 07/07/22  1808   WBC 9.6  --  9.3 10.7   HGB 11.1* 11.3* 12.0 13.9     --  192 227     No lab results found.  No lab results found.      1. PLAN:    Mobilize with PT/OT   NWB LUE;  OK to use platform walker or quad cane with right arm elevated to minimize swelling.  Continue current pain regimen.  Dressings: Removed saturated ACE and reapplied clean ACE.   Follow-up: At 2 weeks, she will go into a short arm cast.  Between 5 and 6 weeks she will have the cast removed and pin removed. Possible removal of the dorsal Spanning plate once her fracture is healed, likely 6 months from now.    2. Disposition  Needs TCU to increase function and safety for ADL and transfer prior to returning to [facility]  apartment.     Renetta Villarreal PA-C

## 2022-07-11 NOTE — PLAN OF CARE
VSS.  AOX4.  Breathing is unlabored and pt denies SOB on RA.  Pt denies pain.  Assist of 1 with walker and gait belt.  Regular diet with good appetite.  Family at bedside most of shift.    Dressing had moderate drainage; dressing reinforced early in shift and no drainage noted since.    K+ 3.8 and Mag 2.1; rechecks in AM.    BP's taken on legs.

## 2022-07-11 NOTE — PROGRESS NOTES
Full note to follow later today    Given her elevated BP today will remain in the hospital for medication titration    Anticipate discharge to [facility] tomorrow

## 2022-07-11 NOTE — PLAN OF CARE
Goal Outcome Evaluation:     Patient vital signs are at baseline: Yes  Patient able to ambulate as they were prior to admission or with assist devices provided by therapies during their stay:  Yes  Patient MUST void prior to discharge:  Yes  Patient able to tolerate oral intake:  Yes  Pain has adequate pain control using Oral analgesics:  Yes  Does patient have an identified :  Yes, pt is from independent facility.  Has goal D/C date and time been discussed with patient:  Yes, recommended to discharge TCU for PT/OT.

## 2022-07-11 NOTE — PROGRESS NOTES
Orthopedic Surgery  Selam Sifuentes  07/10/2022    Admit Date:  7/7/2022  POD 2 Days Post-Op  S/P Procedure(s):  OPEN REDUCTION INTERNAL FIXATION, FRACTURE, WRIST, WITH I & D OF OPEN FRACTURE with synthes plates and screws    Patient sitting comfortably in bedside chair  Pain controlled.    Tolerating oral intake.    No events overnight.   Denies chest pain or shortness of breath       Temp:  [97.5  F (36.4  C)-98.5  F (36.9  C)] 97.5  F (36.4  C)  Pulse:  [63-82] 63  Resp:  [18-20] 18  BP: (145-182)/(60-83) 169/81  SpO2:  [90 %-99 %] 94 %    Unlabored breathing without audible wheeze  Left arm in sugar tong splint. Mild to moderate bloody drainage on distal aspect of splint.   Patient able to actively flex and extend left digits   SILT. Brisk capillary refill     Labs:  Recent Labs   Lab Test 07/10/22  0612 07/09/22  0714 07/08/22  0733   POTASSIUM 3.8 3.6 3.9     Recent Labs   Lab Test 07/10/22  0612 07/09/22  0714 07/08/22  0733   HGB 11.1* 11.3* 12.0     No lab results found.  Recent Labs   Lab Test 07/10/22  0612 07/08/22  0733 07/07/22  1808    192 227       A/P  1.  Nonweightbearing left upper extremity.  2.  keep Sugar tong splint clean, dry, and intact  3.  Elevate LUE  4.  pain control. Minimize narcotics.  5.  At 2 weeks, transition into a short arm cast.    2. Disposition   Anticipate d/c to TCU pending placement.     Júnior Fishman PA-C

## 2022-07-11 NOTE — PROGRESS NOTES
Care Management Follow Up    Length of Stay (days): 4    Expected Discharge Date: 07/12/2022     Concerns to be Addressed:       Patient plan of care discussed at interdisciplinary rounds: Yes    Anticipated Discharge Disposition: Transitional Care     Anticipated Discharge Services:  therapy  Anticipated Discharge DME:  none    Patient/family educated on Medicare website which has current facility and service quality ratings:  yes  Education Provided on the Discharge Plan:  yes  Patient/Family in Agreement with the Plan: yes    Referrals Placed by CM/SW:  TCU  Private pay costs discussed: Not applicable    Additional Information:  Spoke with [facility] admissions that said that they have not reviewed pt yet but will not have bed until Wed.  CM spoke with [facility] admission and they had a COVID outbreak and are not accepting new pt at this time.  CM called and spoke with pt granddaughter Colette and explained the situation that the doctor thinks pt will be ready for discharge and we may need additional TCU choices.  She said she would review medicare.gov website and call back with additional choices.  Tiffany Irby RN, BSN, PHN, CCM  Care Coordinator  [facility]  [phone]

## 2022-07-12 NOTE — PLAN OF CARE
Goal Outcome Evaluation: Pt up with gait belt and platform walker and 1 assist. Washed self up in bathroom this am and good appetite for breakfast. Alert and oriented. Occasional dry cough lungs clear. Has been up in chair for most of day and fair appetite for lunch. Left arm covered and ace wrapped. Denies pain and taking scheduled tylenol. Plan to discharge hopefully tomorrow to TCU.

## 2022-07-12 NOTE — PROGRESS NOTES
Care Management Discharge Note    Discharge Date: 07/13/2022       Discharge Disposition: Transitional Care    Discharge Services: None    Discharge DME: None    Discharge Transportation: agency    Private pay costs discussed: private room/amenity fees and transportation costs    PAS Confirmation Code:  You have successfully submitted the preadmission screening (PAS) to the [employer] on: 7/12/2022 2:59 PM  Your confirmation number is: [id number]  Results  Patient/family educated on Medicare website which has current facility and service quality ratings: no    Education Provided on the Discharge Plan:  Yes  Persons Notified of Discharge Plans: Patient, granddaughter, sister  Patient/Family in Agreement with the Plan: yes    Handoff Referral Completed: No    Additional Information:  Discharge Plan:  [facility]  Transport: 1500 Wednesday 7/13 by family        Lashae Carballo, RN      Lashae Carballo RN Case Manager  Inpatient Care Coordination  [facility]   [phone]

## 2022-07-12 NOTE — PLAN OF CARE
Goal Outcome Evaluation:    Plan of Care Reviewed With: patient     Overall Patient Progress: improving  Pain controlled with scheduled tylenol. Up in chair until HS. Up with sba, GB and walker to  and hardy. Marisela regular diet. Voiding in good amts. Cms intact. Splint/drsg CD&I. LUE swollen & bruised. Discharge to TCU when bed arranged. Family has added an option for a TCU in [place]- name written on board in pt room.

## 2022-07-12 NOTE — PROGRESS NOTES
Care Management Follow Up    Length of Stay (days): 5    Expected Discharge Date: 07/12/2022     Concerns to be Addressed: care coordination/care conferences, discharge planning     Patient plan of care discussed at interdisciplinary rounds: Yes    Anticipated Discharge Disposition: Transitional Care     Anticipated Discharge Services: None  Anticipated Discharge DME: None    Patient/family educated on Medicare website which has current facility and service quality ratings: no  Education Provided on the Discharge Plan:  Yes  Patient/Family in Agreement with the Plan: yes    Referrals Placed by CM/SW: Post Acute Facilities, Transportation  Private pay costs discussed: private room/amenity fees and transportation costs    Additional Information:  Care management following for discharge planning to TCU. Spoke with granddaelfego Colette over the phone. Updated on status of TCU referrals. Granddaelfego Alvarenga [facility].   [facility] has accepted patient into TCU with bed availability on Thursday 7/14. Reviewed out of pocket cost for [facility] transport, Spoke with granddaelfego Myers, they expressed understanding and are agreeable to this. Transport to be arranged.     Update 1420: Received call back from admissions at [facility] stating that they have TCU bed availability on Wednesday 7/13 after 1500 into a semi-private room.   Patient and granddaughter Colette updated. Patient is considering family transport vs  [employer]  transport.    [facility] transport scheduled for 1500 on Wednesday 7/13.     Update 1455: Received phone call from granddaughter Colette who had spoken with patient and family.   Plan for family to transport patient to TCU at 1500 Wednesday.  [facility] transport cancelled.     Lashae Carballo, RN      Lashae Carballo RN Case Manager  Inpatient Care Coordination  [facility]   [phone]

## 2022-07-12 NOTE — PROGRESS NOTES
Orthopedic Surgery  Selam Sifuentes  07/12/2022     Admit Date:  7/7/2022    POD: 4 Days Post-Op   Procedure(s):  1.  Sharp excisional debridement of skin, subcutaneous tissue, fat, fascia with curettage of bone, left ulna open fracture. 2.  Open reduction and spanning internal fixation of left distal radius. 3.  Closed reduction and percutaneous pinning, left distal radius. 4.  Open reduction and internal fixation of left distal ulna.    Patient upright in a chair.    Pain controlled.  Tolerating oral intake.    Denies nausea or vomiting  Denies chest pain or shortness of breath    Temp:  [97.1  F (36.2  C)-98.6  F (37  C)] 98.1  F (36.7  C)  Pulse:  [66-85] 85  Resp:  [16] 16  BP: (138-155)/(51-63) 155/59  SpO2:  [87 %-94 %] 92 %    LUE:  Splint is clean, dry and intact.  No drainage.   Some swelling in digits; warm and well perfused.  Sensation intact to all digits.  Active range of motion all digits, can make okay sign, strength intact and minimal discomfort with exam.     Labs:  Recent Labs   Lab Test 07/10/22  0612 07/09/22  0714 07/08/22  0733 07/07/22  1808   WBC 9.6  --  9.3 10.7   HGB 11.1* 11.3* 12.0 13.9     --  192 227     1. PLAN:    Mobilize with PT/OT   NWB LUE;  OK to use platform walker or quad cane with right arm elevated to minimize swelling.  Continue current pain regimen.  Dressings: Keep splint intact.   Follow-up: At 2 weeks, she will go into a short arm cast.  Between 5 and 6 weeks she will have the cast removed and pin removed. Possible removal of the dorsal Spanning plate once her fracture is healed, likely 6 months from now.    2. Disposition  Needs TCU to increase function and safety for ADL and transfer prior to returning to [facility].     Megan Gonzalez PA-C

## 2022-07-12 NOTE — PLAN OF CARE
Goal Outcome Evaluation:    Patient vital signs are at baseline: Yes  Patient able to ambulate as they were prior to admission or with assist devices provided by therapies during their stay:  Yes, A1 with platform walker/GB  Patient MUST void prior to discharge:  Yes  Patient able to tolerate oral intake:  Yes, regular diet  Pain has adequate pain control using Oral analgesics:  Yes, denies pain  Does patient have an identified :  Yes, plan to discharge to TCU once placement found  Has goal D/C date and time been discussed with patient:  Yes    Pt A/O x4. Dressing and splint to left arm CDI. CMS intact. NWB to LUE. K+ and mag protocols. Will continue to monitor.

## 2022-07-12 NOTE — PROGRESS NOTES
[facility]  Hospitalist Progress Note  Artemio Elizondo MD 07/12/2022    Reason for Stay (Diagnosis): HAWA jaramillo         Assessment and Plan:      Summary of Stay: Selam Sifuentes is a 89 year old female with a PMH significant for asthma, hypertension, type 2 diabetes, hyperlipidemia, CKD stage III and chronic systolic congestive heart failure who presented after a mechanical fall with fracture of her left arm.  She underwent surgical repair on 7/8/22.    Course was complicated by severe HTN; this has improved with med titration     Plans today  - anticipate discharge to TCU when bed is available     1.  Open left radius and ulna fractures.  -Status post surgical repair by orthopedic surgery on 7/8.  -Pain medications as needed.  -Use incentive spirometry.  - PT/OT     2.  Hypertension.  -home dose is carvedilol 12.5 mg twice a day - increased to 25 mg bid.  -on losartan 25 mg a day - increased to 50 mg daily  -home dose is amlodipine 5 mg a day - increased to 10 mg daily.  -IV hydralazine if needed.     3.  Left knee pain.    -No acute fracture noted on x-ray.  -Pain medications as needed.     4.  Hyperlipidemia.  -Continue atorvastatin 10 mg a day.     5.  Depression.  -Continue sertraline 100 mg a day.  -Continue mirtazapine 15 mg at bedtime.        Diet: Advance Diet as Tolerated: Regular Diet Adult    DVT Prophylaxis: Pneumatic Compression Devices and aspirin  Mon Catheter: Not present  Central Lines: None  Cardiac Monitoring: None  Code Status: No CPR- Do NOT Intubate   DISPO:  TCU on discharge when bed is found            Interval History (Subjective):      No complaints.  Pain controlled.  TCU being planned on discharge                  Physical Exam:      Last Vital Signs:  BP (!) 155/59 (BP Location: Right leg)   Pulse 85   Temp 98.1  F (36.7  C) (Temporal)   Resp 16   Ht 1.524 m (5')   Wt 60.1 kg (132 lb 9.6 oz)   SpO2 92%   BMI 25.90 kg/m      No intake or output data in  the 24 hours ending 07/12/22 1314    Constitutional: Awake, alert, cooperative, no apparent distress   Respiratory: Clear to auscultation bilaterally, no crackles or wheezing   Cardiovascular: Regular rate and rhythm, normal S1 and S2, and no murmur noted   Abdomen: Normal bowel sounds, soft, non-distended, non-tender   Skin: No rashes, no cyanosis, dry to touch   Neuro: Alert and oriented x3, no weakness, numbness, memory loss   Extremities: No edema, normal range of motion   Other(s): L hand warm to touch; sensation intact, movement intact       All other systems: Negative          Medications:      All current medications were reviewed with changes reflected in problem list.         Data:      All new lab and imaging data was reviewed.   Labs:  Recent Labs   Lab 07/12/22  0633 07/11/22  0703 07/11/22  0606 07/10/22  1626 07/10/22  0612 07/09/22  0714 07/08/22  1154 07/08/22  0733 07/07/22  1808   NA  --   --   --   --   --  133  --  141 138   POTASSIUM 4.1  --  3.7  --  3.8 3.6  --  3.9 4.6   CHLORIDE  --   --   --   --   --  99  --  107 103   CO2  --   --   --   --   --  29  --  29 31   ANIONGAP  --   --   --   --   --  5  --  5 4   GLC  --  104*  --  124*  --  142*  142*   < > 86 136*   BUN  --   --   --   --   --  8  --  16 22   CR  --   --   --   --   --  0.49*  --  0.64 0.64   GFRESTIMATED  --   --   --   --   --  87  --  81 81   JOHN  --   --   --   --   --  8.1*  --  8.6 9.2  9.3    < > = values in this interval not displayed.     Recent Labs   Lab 07/10/22  0612   WBC 9.6   HGB 11.1*   HCT 35.0   MCV 91         Imaging:   No results found for this or any previous visit (from the past 24 hour(s)).

## 2022-07-13 NOTE — PLAN OF CARE
Goal Outcome Evaluation:  Patient vital signs are at baseline: Yes  Patient able to ambulate as they were prior to admission or with assist devices provided by therapies during their stay:  Yes  Patient MUST void prior to discharge:  Yes  Patient able to tolerate oral intake:  Yes  Pain has adequate pain control using Oral analgesics:  Yes  Does patient have an identified :  Yes  Has goal D/C date and time been discussed with patient:  Yes    UP with assist 1/gait belt/platform walker.  Ambulated in room, up to BR and up to chair.  Tolerating regular diet, good appetite.  Voiding without difficulty, BM x1.  CMS intact, LUE ace c/d/I.  BP elevated at 202/101, given scheduled BP meds, recheck 144/48.  Discharge instructions reviewed with patient, discharge packet given to daughter.  Personal belongings with patient at discharge.  Discharged via w/c to TCU.

## 2022-07-13 NOTE — DISCHARGE SUMMARY
Service Date: 07/13/2022    DATE OF ADMISSION:  07/07/2022.    DATE OF DISCHARGE:  07/13/2022.      PRINCIPAL FINAL DIAGNOSES:  1.  Open left radius and ulna fracture, status post surgical repair on 07/08/2022.  Fracture related to osteoporosis.    2.  Hypertension.  3.  Hyperlipidemia.  4.  History of depression.  5.  History of chronic systolic heart failure without evidence of exacerbation this admission.  6.  History of asthma.  7.  History of chronic kidney disease stage III.      PRINCIPAL PROCEDURES THIS ADMISSION:    1.  Orthopedics consultation.  2.  Operative repair of open left upper extremity fracture.  3.  Wrist x-ray.  4.  Knee x-ray.     5.  Physical therapy consultation.  5.  Social Work assistance with disposition.  The patient is being discharged to TCU.    REASON FOR ADMISSION:  Please see dictated history and physical.  In brief, Ms. Braden Sifuentes is a 89-year-old female who presented to the hospital after a mechanical fall.  The patient suffered a left upper extremity fracture.  She was admitted to the hospitalist service.    HOSPITAL COURSE:    1.  Left open distal radius and ulna fracture:  Patient was admitted to the hospitalist service.  Orthopedic Surgery was consulted.  The patient underwent open reduction and internal fixation with Synthes plates and screws.  The patient tolerated the procedure well.  She received IV antibiotics this admission.  She was seen by rehabilitation therapy who recommended discharge to TCU.  Plan is for discharge to TCU today.    DISCHARGE MEDICATIONS:    1.  Acetaminophen 975 mg every 8 hours.  2.  Atarax 10 mg every 6 hours as needed for itching or pain.  3.  Oxycodone 2.5-5 mg every 4 hours as needed for pain.  4.  MiraLax 17 grams daily.  5.  Senokot 1 tablet twice a day as needed for constipation.  6.  Aspirin 325 mg daily.  7.  Coreg 25 mg twice a day.  Dose increased from previous.  8.  Eylea inject into eye every 28 days.  9.  Losartan 50 mg daily.   Dose increased this admission.  10.  Albuterol as needed.  11.  Amlodipine 10 mg daily.  12.  Atorvastatin 10 mg daily.  13.  Calcium carbonate with vitamin D.  14.  Docusate 200 mg every evening.  15.  Allegra 180 mg daily.  18.  Flovent 1 puff into lungs twice a day.  19.  Melatonin 3 mg nightly as needed for sleep.  20.  Remeron 15 mg at bedtime.  21.  Multivitamin daily.  21.  Zoloft 100 mg daily.    FOLLOWUP INSTRUCTIONS:    1.  Follow up with Dr. Johnson at [facility] in 2 weeks.  The patient provided phone number to get an appointment.  2.  Follow up with primary MD in clinic 1 week after discharge from rehabilitation center to assess blood pressure management.    Physical therapy and occupational therapy consults were ordered at the TCU.    The [facility] [place] [facility].    I examined this patient on day of discharge.    I spent 32 minutes caring for this patient today, including physical exam, answering patient questions, reviewing lab work, reviewing imaging studies, reviewing chart notes, ordering discharge medications, performing discharge orders for TCU, and dictating discharge summary.    Artemio Elizondo MD        D: 2022   T: 2022   MT: CURTIS    Name:     SUNDAR SEVILLABrooklynn  MRN:      [id number]        Account:      [id number]   :      10/28/1927           Service Date: 2022       Document: [id number]

## 2022-07-13 NOTE — PLAN OF CARE
Occupational Therapy Discharge Summary    Reason for therapy discharge:    Discharged to transitional care facility.    Progress towards therapy goal(s). See goals on Care Plan in [facility] electronic health record for goal details.  Goals partially met.  Barriers to achieving goals:   discharge from facility.    Therapy recommendation(s):    Continued therapy is recommended.  Rationale/Recommendations:  Per chart review, patient would benefit from continued OT in TCU setting to progress ADL independence and safety to prior level of function.

## 2022-07-13 NOTE — PROGRESS NOTES
I saw and examined this patient today    She appears stable to discharge to TCU    She is frustrated that her hearing aide is occluded by wax and what she needs to clean it is at home.  Will order her a pocket talker to use today

## 2022-07-13 NOTE — PLAN OF CARE
Goal Outcome Evaluation:        Patient vital signs are at baseline: Yes  Patient able to ambulate as they were prior to admission or with assist devices provided by therapies during their stay:  Yes, pt is A-1 with walker and gate belt.  Patient MUST void prior to discharge:  Yes  Patient able to tolerate oral intake:  Yes  Pain has adequate pain control using Oral analgesics:  Yes  Does patient have an identified :  Yes, pt is from independent living.  Has goal D/C date and time been discussed with patient:  Yespt will discharge today to TCU.

## 2022-07-13 NOTE — PROGRESS NOTES
SPIRITUAL HEALTH SERVICES Progress Note    Ortho/Spine 6    I saw Selam per length of stay at . Selam was oriented to SHS. She is very hard of hearing and could not understand what I was saying. I noticed that she was praying the Rosary. Selam was welcoming of prayer. Selam is planning to be discharged today. No further SHS needed at this time.    Inderjit Moncada MDIV  Intern   Phone: [phone]

## 2022-07-13 NOTE — PLAN OF CARE
A&Ox4. Vss on RA. Up with Ax1 with gb and platform walker. Pt reporting pain only 3/10 this evening- controlled with scheduled tylenol. Plan for discharge tomorrow at 3pm.

## 2022-07-13 NOTE — PROGRESS NOTES
Care Management Discharge Note    Discharge Date: 07/13/2022       Discharge Disposition: Transitional Care    Discharge Services: None    Discharge DME: None    Discharge Transportation: agency    Private pay costs discussed: private room/amenity fees and transportation costs    PAS Confirmation Code: [id number]  Patient/family educated on Medicare website which has current facility and service quality ratings: no    Education Provided on the Discharge Plan:  Yes  Persons Notified of Discharge Plans: Patient, family, nursing  Patient/Family in Agreement with the Plan: yes    Handoff Referral Completed: No    Additional Information:  Patient to discharge to TCU today, shared room at [facility]. Discharge orders faxed to TCU.  Family to provide transport at 1500 today.       Lashae Carballo, RN      Lashae Carballo RN Case Manager  Inpatient Care Coordination  [facility]   [phone]

## 2022-07-13 NOTE — PROGRESS NOTES
Orthopedic Surgery  Selam Sifuentes  07/13/2022     Admit Date:  7/7/2022    POD: 5 Days Post-Op   Procedure(s):  1.  Sharp excisional debridement of skin, subcutaneous tissue, fat, fascia with curettage of bone, left ulna open fracture. 2.  Open reduction and spanning internal fixation of left distal radius. 3.  Closed reduction and percutaneous pinning, left distal radius. 4.  Open reduction and internal fixation of left distal ulna.    Alert and oriented.   Patient in bedside chair.    Pain controlled.  Tolerating oral intake.      Temp:  [97.6  F (36.4  C)-98.1  F (36.7  C)] 98  F (36.7  C)  Pulse:  [66-79] 79  Resp:  [18-20] 18  BP: (121-202)/() 202/101  SpO2:  [90 %-92 %] 91 %    LUE:  Splint is clean, dry and intact.  No drainage.   Minimal swelling and ecchymosis in digits; warm and well perfused.  Sensation intact to all digits.  Active range of motion all digits and strength intact.    Labs:  Recent Labs   Lab Test 07/10/22  0612 07/09/22  0714 07/08/22  0733 07/07/22  1808   WBC 9.6  --  9.3 10.7   HGB 11.1* 11.3* 12.0 13.9     --  192 227       1. PLAN:    Continue to mobilize with PT/OT   NWB LUE;  OK to use platform walker or quad cane with right arm elevated to minimize swelling.  Continue current pain regimen.  Dressings: Keep splint intact.   Follow-up: At 2 weeks, she will go into a short arm cast.  Between 5 and 6 weeks she will have the cast removed and pin removed. Possible removal of the dorsal Spanning plate once her fracture is healed, likely 6 months from now.     2. Disposition  Transfer to [facility] today.      Renetta Villarreal PA-C

## 2022-07-14 NOTE — PROGRESS NOTES
[facility] Care Resource [place]    Background: Care Coordination referral placed from [facility] discharge report for reason of patient meeting criteria for a TCM outreach call by [facility] team.    Assessment: Upon chart review, CCRC Team member will cancel/close the referral for TCM outreach due to reason below:    Patient is not established within [facility]. Upon chart review, CCRC Team member noted patient discharged to TCU    Plan: Care Coordination referral for TCM outreach canceled.      Kandace Hernandez MA  [facility], [facility]    *Connected Care Resource Team does NOT follow patient ongoing. Referrals are identified based on internal discharge reports and the outreach is to ensure patient has an understanding of their discharge instructions.

## 2022-12-02 NOTE — ED TRIAGE NOTES
Patient reports increasing SOB since last night. Patient states she has a hx of lung issues and a diagnosis of Asthma. Patient is extremely [place]     Triage Assessment     Row Name 12/02/22 1349       Triage Assessment (Adult)    Airway WDL WDL       Respiratory WDL    Respiratory WDL WDL       Skin Circulation/Temperature WDL    Skin Circulation/Temperature WDL WDL       Cardiac WDL    Cardiac WDL WDL       Peripheral/Neurovascular WDL    Peripheral Neurovascular WDL WDL       Cognitive/Neuro/Behavioral WDL    Cognitive/Neuro/Behavioral WDL WDL

## 2023-01-01 ENCOUNTER — MEDICAL CORRESPONDENCE (OUTPATIENT)
Dept: HEALTH INFORMATION MANAGEMENT | Facility: CLINIC | Age: 88
End: 2023-01-01
Payer: MEDICARE

## 2023-06-01 ENCOUNTER — HEALTH MAINTENANCE LETTER (OUTPATIENT)
Age: 88
End: 2023-06-01

## (undated) DEVICE — TUBING SUCTION 12"X1/4" N612

## (undated) DEVICE — IMM SHOULDER MED 79-84015

## (undated) DEVICE — GLOVE PROTEXIS POWDER FREE 7.5 ORTHOPEDIC 2D73ET75

## (undated) DEVICE — PACK HAND SOP32HARMO

## (undated) DEVICE — CAST BUCKET

## (undated) DEVICE — SOL NACL 0.9% INJ 1000ML BAG 07983-09

## (undated) DEVICE — SU VICRYL 3-0 CT-1 36" J344H

## (undated) DEVICE — SU ETHILON 4-0 PS-2 18" BLACK 1667H

## (undated) DEVICE — GLOVE PROTEXIS POWDER FREE 8.0 ORTHOPEDIC 2D73ET80

## (undated) DEVICE — LINEN FULL SHEET 5511

## (undated) DEVICE — PREP CHLORAPREP 26ML TINTED HI-LITE ORANGE 930815

## (undated) DEVICE — BNDG ELASTIC 3"X5YDS UNSTERILE 6611-30

## (undated) DEVICE — GLOVE PROTEXIS BLUE W/NEU-THERA 8.0  2D73EB80

## (undated) DEVICE — LINEN TOWEL PACK X5 5464

## (undated) DEVICE — BAG CLEAR TRASH 1.3M 39X33" P4040C

## (undated) DEVICE — LINEN HALF SHEET 5512

## (undated) DEVICE — GLOVE PROTEXIS BLUE W/NEU-THERA 7.5  2D73EB75

## (undated) DEVICE — DRILL BIT 1.8MM  310.509

## (undated) DEVICE — TUBING CYSTO/BLADDER IRRIG SET 80" 06544-01

## (undated) DEVICE — TOURNIQUET SGL BLADDER 18"X4" RED 5921-218-135

## (undated) RX ORDER — FENTANYL CITRATE 50 UG/ML
INJECTION, SOLUTION INTRAMUSCULAR; INTRAVENOUS
Status: DISPENSED
Start: 2022-01-01

## (undated) RX ORDER — DEXMEDETOMIDINE HYDROCHLORIDE 4 UG/ML
INJECTION, SOLUTION INTRAVENOUS
Status: DISPENSED
Start: 2022-01-01

## (undated) RX ORDER — CEFAZOLIN SODIUM/WATER 2 G/20 ML
SYRINGE (ML) INTRAVENOUS
Status: DISPENSED
Start: 2022-01-01

## (undated) RX ORDER — BUPIVACAINE HYDROCHLORIDE 5 MG/ML
INJECTION, SOLUTION EPIDURAL; INTRACAUDAL
Status: DISPENSED
Start: 2022-01-01